# Patient Record
Sex: MALE | URBAN - METROPOLITAN AREA
[De-identification: names, ages, dates, MRNs, and addresses within clinical notes are randomized per-mention and may not be internally consistent; named-entity substitution may affect disease eponyms.]

---

## 2017-10-28 ENCOUNTER — INPATIENT (INPATIENT)
Facility: HOSPITAL | Age: 82
LOS: 12 days | Discharge: HOSPICE | DRG: 193 | End: 2017-11-10
Attending: INTERNAL MEDICINE | Admitting: INTERNAL MEDICINE
Payer: MEDICARE

## 2017-10-28 VITALS
HEIGHT: 74 IN | WEIGHT: 160.06 LBS | SYSTOLIC BLOOD PRESSURE: 160 MMHG | TEMPERATURE: 98 F | RESPIRATION RATE: 20 BRPM | DIASTOLIC BLOOD PRESSURE: 100 MMHG | HEART RATE: 78 BPM | OXYGEN SATURATION: 100 %

## 2017-10-28 PROBLEM — Z00.00 ENCOUNTER FOR PREVENTIVE HEALTH EXAMINATION: Status: ACTIVE | Noted: 2017-10-28

## 2017-10-28 PROCEDURE — 71010: CPT | Mod: 26

## 2017-10-28 NOTE — ED ADULT NURSE NOTE - ED STAT RN HANDOFF DETAILS 2
report given to EVER Ramos on [address] room 618[address] in stable condition for continuation of care. pt admitted for aspiration pneumonia. 20G RAC. lives at home with 24/7 HHA.

## 2017-10-28 NOTE — ED ADULT NURSE NOTE - OBJECTIVE STATEMENT
89 y/o M pt with no significant PMHx and no significant PSHx presents to ED with increased lethargy and cough x2 weeks. As per pt's relative, pt is minimally verbal, and not active; pt is currently more lethargic than at baseline. Pt's relative reports pt lives with a 24/7 home health aide. 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

## 2017-10-29 DIAGNOSIS — I48.91 UNSPECIFIED ATRIAL FIBRILLATION: ICD-10-CM

## 2017-10-29 DIAGNOSIS — Z95.1 PRESENCE OF AORTOCORONARY BYPASS GRAFT: Chronic | ICD-10-CM

## 2017-10-29 DIAGNOSIS — N17.9 ACUTE KIDNEY FAILURE, UNSPECIFIED: ICD-10-CM

## 2017-10-29 DIAGNOSIS — J69.0 PNEUMONITIS DUE TO INHALATION OF FOOD AND VOMIT: ICD-10-CM

## 2017-10-29 DIAGNOSIS — Z29.9 ENCOUNTER FOR PROPHYLACTIC MEASURES, UNSPECIFIED: ICD-10-CM

## 2017-10-29 LAB
ALBUMIN SERPL ELPH-MCNC: 3.2 G/DL — LOW (ref 3.5–5)
ALP SERPL-CCNC: 95 U/L — SIGNIFICANT CHANGE UP (ref 40–120)
ALT FLD-CCNC: 26 U/L DA — SIGNIFICANT CHANGE UP (ref 10–60)
ANION GAP SERPL CALC-SCNC: 6 MMOL/L — SIGNIFICANT CHANGE UP (ref 5–17)
ANION GAP SERPL CALC-SCNC: 9 MMOL/L — SIGNIFICANT CHANGE UP (ref 5–17)
APPEARANCE UR: CLEAR — SIGNIFICANT CHANGE UP
APTT BLD: 34 SEC — SIGNIFICANT CHANGE UP (ref 27.5–37.4)
AST SERPL-CCNC: 17 U/L — SIGNIFICANT CHANGE UP (ref 10–40)
BASOPHILS # BLD AUTO: 0 K/UL — SIGNIFICANT CHANGE UP (ref 0–0.2)
BASOPHILS # BLD AUTO: 0.1 K/UL — SIGNIFICANT CHANGE UP (ref 0–0.2)
BASOPHILS NFR BLD AUTO: 0.6 % — SIGNIFICANT CHANGE UP (ref 0–2)
BASOPHILS NFR BLD AUTO: 0.9 % — SIGNIFICANT CHANGE UP (ref 0–2)
BILIRUB SERPL-MCNC: 0.8 MG/DL — SIGNIFICANT CHANGE UP (ref 0.2–1.2)
BILIRUB UR-MCNC: NEGATIVE — SIGNIFICANT CHANGE UP
BUN SERPL-MCNC: 33 MG/DL — HIGH (ref 7–18)
BUN SERPL-MCNC: 34 MG/DL — HIGH (ref 7–18)
CALCIUM SERPL-MCNC: 9.4 MG/DL — SIGNIFICANT CHANGE UP (ref 8.4–10.5)
CALCIUM SERPL-MCNC: 9.6 MG/DL — SIGNIFICANT CHANGE UP (ref 8.4–10.5)
CHLORIDE SERPL-SCNC: 111 MMOL/L — HIGH (ref 96–108)
CHLORIDE SERPL-SCNC: 113 MMOL/L — HIGH (ref 96–108)
CHOLEST SERPL-MCNC: 127 MG/DL — SIGNIFICANT CHANGE UP (ref 10–199)
CK MB BLD-MCNC: 5.7 % — HIGH (ref 0–3.5)
CK MB BLD-MCNC: 5.8 % — HIGH (ref 0–3.5)
CK MB CFR SERPL CALC: 2.6 NG/ML — SIGNIFICANT CHANGE UP (ref 0–3.6)
CK MB CFR SERPL CALC: 2.8 NG/ML — SIGNIFICANT CHANGE UP (ref 0–3.6)
CK SERPL-CCNC: 46 U/L — SIGNIFICANT CHANGE UP (ref 35–232)
CK SERPL-CCNC: 48 U/L — SIGNIFICANT CHANGE UP (ref 35–232)
CO2 SERPL-SCNC: 26 MMOL/L — SIGNIFICANT CHANGE UP (ref 22–31)
CO2 SERPL-SCNC: 29 MMOL/L — SIGNIFICANT CHANGE UP (ref 22–31)
COLOR SPEC: YELLOW — SIGNIFICANT CHANGE UP
CREAT SERPL-MCNC: 1.78 MG/DL — HIGH (ref 0.5–1.3)
CREAT SERPL-MCNC: 2 MG/DL — HIGH (ref 0.5–1.3)
DIFF PNL FLD: ABNORMAL
EOSINOPHIL # BLD AUTO: 0 K/UL — SIGNIFICANT CHANGE UP (ref 0–0.5)
EOSINOPHIL # BLD AUTO: 0 K/UL — SIGNIFICANT CHANGE UP (ref 0–0.5)
EOSINOPHIL NFR BLD AUTO: 0 % — SIGNIFICANT CHANGE UP (ref 0–6)
EOSINOPHIL NFR BLD AUTO: 0 % — SIGNIFICANT CHANGE UP (ref 0–6)
FOLATE SERPL-MCNC: >20 NG/ML — SIGNIFICANT CHANGE UP (ref 4.8–24.2)
GLUCOSE SERPL-MCNC: 111 MG/DL — HIGH (ref 70–99)
GLUCOSE SERPL-MCNC: 151 MG/DL — HIGH (ref 70–99)
GLUCOSE UR QL: NEGATIVE — SIGNIFICANT CHANGE UP
HBA1C BLD-MCNC: 5.6 % — SIGNIFICANT CHANGE UP (ref 4–5.6)
HCT VFR BLD CALC: 40.2 % — SIGNIFICANT CHANGE UP (ref 39–50)
HCT VFR BLD CALC: 42.3 % — SIGNIFICANT CHANGE UP (ref 39–50)
HDLC SERPL-MCNC: 44 MG/DL — SIGNIFICANT CHANGE UP (ref 40–125)
HGB BLD-MCNC: 12.5 G/DL — LOW (ref 13–17)
HGB BLD-MCNC: 13.6 G/DL — SIGNIFICANT CHANGE UP (ref 13–17)
INR BLD: 1.33 RATIO — HIGH (ref 0.88–1.16)
KETONES UR-MCNC: ABNORMAL
LACTATE SERPL-SCNC: 2.2 MMOL/L — HIGH (ref 0.7–2)
LACTATE SERPL-SCNC: 2.4 MMOL/L — HIGH (ref 0.7–2)
LEUKOCYTE ESTERASE UR-ACNC: NEGATIVE — SIGNIFICANT CHANGE UP
LIPID PNL WITH DIRECT LDL SERPL: 65 MG/DL — SIGNIFICANT CHANGE UP
LYMPHOCYTES # BLD AUTO: 0.6 K/UL — LOW (ref 1–3.3)
LYMPHOCYTES # BLD AUTO: 0.6 K/UL — LOW (ref 1–3.3)
LYMPHOCYTES # BLD AUTO: 7.3 % — LOW (ref 13–44)
LYMPHOCYTES # BLD AUTO: 8.6 % — LOW (ref 13–44)
MAGNESIUM SERPL-MCNC: 2.6 MG/DL — SIGNIFICANT CHANGE UP (ref 1.6–2.6)
MCHC RBC-ENTMCNC: 31.1 GM/DL — LOW (ref 32–36)
MCHC RBC-ENTMCNC: 31.6 PG — SIGNIFICANT CHANGE UP (ref 27–34)
MCHC RBC-ENTMCNC: 32.1 GM/DL — SIGNIFICANT CHANGE UP (ref 32–36)
MCHC RBC-ENTMCNC: 32.9 PG — SIGNIFICANT CHANGE UP (ref 27–34)
MCV RBC AUTO: 101.8 FL — HIGH (ref 80–100)
MCV RBC AUTO: 102.4 FL — HIGH (ref 80–100)
MONOCYTES # BLD AUTO: 0.7 K/UL — SIGNIFICANT CHANGE UP (ref 0–0.9)
MONOCYTES # BLD AUTO: 0.8 K/UL — SIGNIFICANT CHANGE UP (ref 0–0.9)
MONOCYTES NFR BLD AUTO: 11.2 % — SIGNIFICANT CHANGE UP (ref 2–14)
MONOCYTES NFR BLD AUTO: 8.9 % — SIGNIFICANT CHANGE UP (ref 2–14)
NEUTROPHILS # BLD AUTO: 5.8 K/UL — SIGNIFICANT CHANGE UP (ref 1.8–7.4)
NEUTROPHILS # BLD AUTO: 6.4 K/UL — SIGNIFICANT CHANGE UP (ref 1.8–7.4)
NEUTROPHILS NFR BLD AUTO: 79.5 % — HIGH (ref 43–77)
NEUTROPHILS NFR BLD AUTO: 82.9 % — HIGH (ref 43–77)
NITRITE UR-MCNC: NEGATIVE — SIGNIFICANT CHANGE UP
NT-PROBNP SERPL-SCNC: HIGH PG/ML (ref 0–450)
PH UR: 5 — SIGNIFICANT CHANGE UP (ref 5–8)
PHOSPHATE SERPL-MCNC: 4.2 MG/DL — SIGNIFICANT CHANGE UP (ref 2.5–4.5)
PLATELET # BLD AUTO: 136 K/UL — LOW (ref 150–400)
PLATELET # BLD AUTO: 146 K/UL — LOW (ref 150–400)
POTASSIUM SERPL-MCNC: 4.2 MMOL/L — SIGNIFICANT CHANGE UP (ref 3.5–5.3)
POTASSIUM SERPL-MCNC: 4.5 MMOL/L — SIGNIFICANT CHANGE UP (ref 3.5–5.3)
POTASSIUM SERPL-SCNC: 4.2 MMOL/L — SIGNIFICANT CHANGE UP (ref 3.5–5.3)
POTASSIUM SERPL-SCNC: 4.5 MMOL/L — SIGNIFICANT CHANGE UP (ref 3.5–5.3)
PROT SERPL-MCNC: 7.2 G/DL — SIGNIFICANT CHANGE UP (ref 6–8.3)
PROT UR-MCNC: 500 MG/DL
PROTHROM AB SERPL-ACNC: 14.6 SEC — HIGH (ref 9.8–12.7)
RAPID RVP RESULT: SIGNIFICANT CHANGE UP
RBC # BLD: 3.96 M/UL — LOW (ref 4.2–5.8)
RBC # BLD: 4.13 M/UL — LOW (ref 4.2–5.8)
RBC # FLD: 15.2 % — HIGH (ref 10.3–14.5)
RBC # FLD: 16 % — HIGH (ref 10.3–14.5)
SODIUM SERPL-SCNC: 146 MMOL/L — HIGH (ref 135–145)
SODIUM SERPL-SCNC: 148 MMOL/L — HIGH (ref 135–145)
SP GR SPEC: 1.02 — SIGNIFICANT CHANGE UP (ref 1.01–1.02)
TOTAL CHOLESTEROL/HDL RATIO MEASUREMENT: 2.9 RATIO — LOW (ref 3.4–9.6)
TRIGL SERPL-MCNC: 91 MG/DL — SIGNIFICANT CHANGE UP (ref 10–149)
TROPONIN I SERPL-MCNC: 0.12 NG/ML — HIGH (ref 0–0.04)
TROPONIN I SERPL-MCNC: 0.14 NG/ML — HIGH (ref 0–0.04)
TSH SERPL-MCNC: 1.39 UU/ML — SIGNIFICANT CHANGE UP (ref 0.34–4.82)
UROBILINOGEN FLD QL: NEGATIVE — SIGNIFICANT CHANGE UP
VIT B12 SERPL-MCNC: 1368 PG/ML — HIGH (ref 243–894)
WBC # BLD: 7.3 K/UL — SIGNIFICANT CHANGE UP (ref 3.8–10.5)
WBC # BLD: 7.7 K/UL — SIGNIFICANT CHANGE UP (ref 3.8–10.5)
WBC # FLD AUTO: 7.3 K/UL — SIGNIFICANT CHANGE UP (ref 3.8–10.5)
WBC # FLD AUTO: 7.7 K/UL — SIGNIFICANT CHANGE UP (ref 3.8–10.5)

## 2017-10-29 PROCEDURE — 99285 EMERGENCY DEPT VISIT HI MDM: CPT | Mod: 25

## 2017-10-29 RX ORDER — IPRATROPIUM/ALBUTEROL SULFATE 18-103MCG
3 AEROSOL WITH ADAPTER (GRAM) INHALATION EVERY 6 HOURS
Qty: 0 | Refills: 0 | Status: DISCONTINUED | OUTPATIENT
Start: 2017-10-29 | End: 2017-11-10

## 2017-10-29 RX ORDER — SODIUM CHLORIDE 9 MG/ML
1000 INJECTION, SOLUTION INTRAVENOUS
Qty: 0 | Refills: 0 | Status: DISCONTINUED | OUTPATIENT
Start: 2017-10-29 | End: 2017-10-29

## 2017-10-29 RX ORDER — METRONIDAZOLE 500 MG
500 TABLET ORAL EVERY 8 HOURS
Qty: 0 | Refills: 0 | Status: DISCONTINUED | OUTPATIENT
Start: 2017-10-29 | End: 2017-10-30

## 2017-10-29 RX ORDER — SODIUM CHLORIDE 9 MG/ML
1000 INJECTION, SOLUTION INTRAVENOUS
Qty: 0 | Refills: 0 | Status: DISCONTINUED | OUTPATIENT
Start: 2017-10-29 | End: 2017-10-31

## 2017-10-29 RX ORDER — AZITHROMYCIN 500 MG/1
500 TABLET, FILM COATED ORAL ONCE
Qty: 0 | Refills: 0 | Status: COMPLETED | OUTPATIENT
Start: 2017-10-29 | End: 2017-10-29

## 2017-10-29 RX ORDER — SODIUM CHLORIDE 9 MG/ML
1000 INJECTION INTRAMUSCULAR; INTRAVENOUS; SUBCUTANEOUS ONCE
Qty: 0 | Refills: 0 | Status: COMPLETED | OUTPATIENT
Start: 2017-10-29 | End: 2017-10-29

## 2017-10-29 RX ORDER — INFLUENZA VIRUS VACCINE 15; 15; 15; 15 UG/.5ML; UG/.5ML; UG/.5ML; UG/.5ML
0.5 SUSPENSION INTRAMUSCULAR ONCE
Qty: 0 | Refills: 0 | Status: DISCONTINUED | OUTPATIENT
Start: 2017-10-29 | End: 2017-11-03

## 2017-10-29 RX ORDER — HEPARIN SODIUM 5000 [USP'U]/ML
5000 INJECTION INTRAVENOUS; SUBCUTANEOUS EVERY 8 HOURS
Qty: 0 | Refills: 0 | Status: DISCONTINUED | OUTPATIENT
Start: 2017-10-29 | End: 2017-11-03

## 2017-10-29 RX ORDER — AZITHROMYCIN 500 MG/1
500 TABLET, FILM COATED ORAL EVERY 24 HOURS
Qty: 0 | Refills: 0 | Status: DISCONTINUED | OUTPATIENT
Start: 2017-10-30 | End: 2017-10-30

## 2017-10-29 RX ORDER — ASPIRIN/CALCIUM CARB/MAGNESIUM 324 MG
300 TABLET ORAL DAILY
Qty: 0 | Refills: 0 | Status: DISCONTINUED | OUTPATIENT
Start: 2017-10-29 | End: 2017-11-03

## 2017-10-29 RX ORDER — ACETYLCYSTEINE 200 MG/ML
4 VIAL (ML) MISCELLANEOUS EVERY 6 HOURS
Qty: 0 | Refills: 0 | Status: DISCONTINUED | OUTPATIENT
Start: 2017-10-29 | End: 2017-11-03

## 2017-10-29 RX ORDER — AZITHROMYCIN 500 MG/1
TABLET, FILM COATED ORAL
Qty: 0 | Refills: 0 | Status: DISCONTINUED | OUTPATIENT
Start: 2017-10-29 | End: 2017-10-30

## 2017-10-29 RX ORDER — CEFTRIAXONE 500 MG/1
1 INJECTION, POWDER, FOR SOLUTION INTRAMUSCULAR; INTRAVENOUS EVERY 24 HOURS
Qty: 0 | Refills: 0 | Status: DISCONTINUED | OUTPATIENT
Start: 2017-10-29 | End: 2017-10-30

## 2017-10-29 RX ORDER — PIPERACILLIN AND TAZOBACTAM 4; .5 G/20ML; G/20ML
3.38 INJECTION, POWDER, LYOPHILIZED, FOR SOLUTION INTRAVENOUS ONCE
Qty: 0 | Refills: 0 | Status: COMPLETED | OUTPATIENT
Start: 2017-10-29 | End: 2017-10-29

## 2017-10-29 RX ADMIN — Medication 4 MILLILITER(S): at 20:47

## 2017-10-29 RX ADMIN — SODIUM CHLORIDE 60 MILLILITER(S): 9 INJECTION, SOLUTION INTRAVENOUS at 02:08

## 2017-10-29 RX ADMIN — AZITHROMYCIN 250 MILLIGRAM(S): 500 TABLET, FILM COATED ORAL at 02:08

## 2017-10-29 RX ADMIN — SODIUM CHLORIDE 60 MILLILITER(S): 9 INJECTION, SOLUTION INTRAVENOUS at 22:57

## 2017-10-29 RX ADMIN — Medication 3 MILLILITER(S): at 20:46

## 2017-10-29 RX ADMIN — HEPARIN SODIUM 5000 UNIT(S): 5000 INJECTION INTRAVENOUS; SUBCUTANEOUS at 15:25

## 2017-10-29 RX ADMIN — Medication 300 MILLIGRAM(S): at 11:54

## 2017-10-29 RX ADMIN — CEFTRIAXONE 100 GRAM(S): 500 INJECTION, POWDER, FOR SOLUTION INTRAMUSCULAR; INTRAVENOUS at 02:26

## 2017-10-29 RX ADMIN — Medication 100 MILLIGRAM(S): at 22:56

## 2017-10-29 RX ADMIN — HEPARIN SODIUM 5000 UNIT(S): 5000 INJECTION INTRAVENOUS; SUBCUTANEOUS at 22:56

## 2017-10-29 RX ADMIN — Medication 3 MILLILITER(S): at 09:48

## 2017-10-29 RX ADMIN — Medication 100 MILLIGRAM(S): at 06:01

## 2017-10-29 RX ADMIN — SODIUM CHLORIDE 60 MILLILITER(S): 9 INJECTION, SOLUTION INTRAVENOUS at 06:04

## 2017-10-29 RX ADMIN — HEPARIN SODIUM 5000 UNIT(S): 5000 INJECTION INTRAVENOUS; SUBCUTANEOUS at 06:00

## 2017-10-29 RX ADMIN — SODIUM CHLORIDE 1000 MILLILITER(S): 9 INJECTION INTRAMUSCULAR; INTRAVENOUS; SUBCUTANEOUS at 01:47

## 2017-10-29 RX ADMIN — PIPERACILLIN AND TAZOBACTAM 200 GRAM(S): 4; .5 INJECTION, POWDER, LYOPHILIZED, FOR SOLUTION INTRAVENOUS at 02:04

## 2017-10-29 RX ADMIN — Medication 3 MILLILITER(S): at 15:30

## 2017-10-29 RX ADMIN — Medication 3 MILLILITER(S): at 02:26

## 2017-10-29 RX ADMIN — Medication 100 MILLIGRAM(S): at 15:24

## 2017-10-29 NOTE — ED ADULT NURSE REASSESSMENT NOTE - NS ED NURSE REASSESS COMMENT FT1
patient condition remains fair. Breathing not distressed, has a chesty cough, no sputum production. IV infusion in progress as ordered. Patient was medicated as ordered. Vitals charted. Is being admitted, awaiting William RN to accept.

## 2017-10-29 NOTE — H&P ADULT - PROBLEM SELECTOR PLAN 3
-Unknown baseline(family says that he had some 'kidney condition' before'. Pt looks clinically dehydrated, likely CKD + prerenal from infection/dehydration.  -Will continue IV hydration for now, at low rate  -f/u renal function in AM, avoid nephrotoxins.

## 2017-10-29 NOTE — H&P ADULT - PROBLEM SELECTOR PLAN 2
-bilateral ankle edema with increased pro-BNP  -Hold diuretics for now, as patient is in JUDAH with acute infection.  -Primary team please call pharmacy in AM and perform med rec.

## 2017-10-29 NOTE — H&P ADULT - ATTENDING COMMENTS
Patient seen and examined. Patient's history, vitals, labs, imaging studies reviewed. Discussed with above resident, agree with note with edits.   Dinorah Felix MD  10/29/2017

## 2017-10-29 NOTE — H&P ADULT - PROBLEM SELECTOR PLAN 1
-Poor oral hygiene, advanced age, bilateral rhonchi Rt>Lt, concern for aspiration PNA.  -Starting Rocephin, zithro, and Flagyl  -NPO for now  -Speech and swallow eval, aspiration precaution  -Check urine legionella and streptococcal Ag  -F/u blood culture  -Gentle hydration as patient has h/o CHF; D5+1/2 NS at 60cc/hr -Poor oral hygiene, advanced age, bilateral rhonchi Rt>Lt, concern for aspiration PNA.  -Starting Rocephin, zithro, and Flagyl  -NPO for now  -Speech and swallow eval, aspiration precaution  -Check urine legionella and streptococcal Ag  -F/u blood culture  -Gentle hydration as patient has h/o CHF; D5+1/2 NS at 60cc/hr  -ID Dr. Clancy

## 2017-10-29 NOTE — H&P ADULT - PROBLEM SELECTOR PLAN 4
-Pt is currently not on AC, however need to check with pharmacy in AM  -CHADSVASC score 3, HAS-BLED score 3 from given information. Patient is considered to be at high risk of having hemorrhage from AC, so not starting full dose AC for now  -Continue home med aspirin, but with rectal administration as patient is at high risk for aspiration.  -H/o bradycardia; not starting tiffanie blockers.

## 2017-10-29 NOTE — ED PROVIDER NOTE - MEDICAL DECISION MAKING DETAILS
89 y/o M pt presents with increased lethargy and cough. Will evaluate for possible aspiration PNA versus sepsis. Will check labs, hydrate, give fluids, check urine, CXR, and likely admission.

## 2017-10-29 NOTE — ED PROVIDER NOTE - ENMT, MLM
Airway patent, Nasal mucosa dry. Mouth with dry mucosa. Throat has no vesicles, no oropharyngeal exudates and uvula is midline.

## 2017-10-29 NOTE — H&P ADULT - NSHPLABSRESULTS_GEN_ALL_CORE
LABS:                        13.6   7.7   )-----------( 146      ( 29 Oct 2017 01:14 )             42.3     10-    148<H>  |  113<H>  |  34<H>  ----------------------------<  111<H>  4.5   |  29  |  2.00<H>    Ca    9.4      29 Oct 2017 01:14    TPro  7.2  /  Alb  3.2<L>  /  TBili  0.8  /  DBili  x   /  AST  17  /  ALT  26  /  AlkPhos  95  10-    PT/INR - ( 29 Oct 2017 01:14 )   PT: 14.6 sec;   INR: 1.33 ratio         PTT - ( 29 Oct 2017 01:14 )  PTT:34.0 sec  Urinalysis Basic - ( 29 Oct 2017 01:58 )    Color: Yellow / Appearance: Clear / S.025 / pH: x  Gluc: x / Ketone: Trace  / Bili: Negative / Urobili: Negative   Blood: x / Protein: 500 mg/dL / Nitrite: Negative   Leuk Esterase: Negative / RBC: 2-5 /HPF / WBC 0-2 /HPF   Sq Epi: x / Non Sq Epi: Few /HPF / Bacteria: x      CAPILLARY BLOOD GLUCOSE        LIVER FUNCTIONS - ( 29 Oct 2017 01:14 )  Alb: 3.2 g/dL / Pro: 7.2 g/dL / ALK PHOS: 95 U/L / ALT: 26 U/L DA / AST: 17 U/L / GGT: x

## 2017-10-29 NOTE — H&P ADULT - HISTORY OF PRESENT ILLNESS
89 years old male from home, has HHA 24/7, minimally verbal, with PMH of   came to ED c/o lethargy and generalized weakness. 89 years old male from home, lives alone, walks with walker intermittently, has HHA 24/7, minimally verbal, with PMH of CAD s/p triple bypass surgery. 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 Patient is very lethargic and not following commands well, so history was given by the HCP Ms. Henry and her son at the bedside. Patient does not have direct family members. Per Mr. Keshav Henry who visits pt's home regularly, patient looked tired than usual on last Saturday(1 week ago) so he recommended he visit his PMD, however patient got upset and refused it. Patient was still able to walk with walker that time. 4 days ago(This Tuesday) he visited patient again, patient looked still bit lethargic however he was able to eat his regular diet. Last night pt's aid reported him that patient could not tolerate oral intake and had to give him pureed food (which is unusual), so Mr. Henry called EMS and brought the patient to ED for further evaluation. Patient has had chronic cough for the last few years, which also got worse with 'gurgling sound' for the same period of time. No reported fever from HHA, no other reported symptoms such as chest pain, nausea, vomiting, SOB, diarrhea, etc.     In ED, pt is hypertensive 160-170/100, O2 sat 100% with NC, labs significant for hypernatremia of 148 with BUN/Cr 34/2.00, pro-BNP 19032, troponin 0.142, CXR bilateral patchy opacities suspicious for multifocal PNA, Rt>Lt. Bilateral rhochi was appreciated on physical exam. EKG afib with rate controlled, unsure if patient had Afib before. Patient is admitted to the medicine floor for suspected aspiration PNA vs CAP.     Of note, according to pt's HCP form(Ms. Henry has it) it was written: I do not wish my life to be prolonged with mechanical repipration, tube feeding, or any other life support, if I should be in an incurable or irreversible mental or physical condition with no reasonable expectation of recovery. Discussed with HCP and her son at the bedside as patient does not have medical capacity at this moment, it is unsure if his PNA is 'incurable or irreversible' condition that was defined by his HCP form. So he will remain in full code for now, and they agreed with it. 89 years old male from home, lives alone, walks with walker intermittently, has HHA 24/7, minimally verbal, with PMH of CAD s/p triple bypass surgery. 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 Patient is very lethargic and not following commands well, so history was given by the HCP Ms. Henry and her son at the bedside. Patient does not have direct family members. Per Mr. Keshav Henry who visits pt's home regularly, patient looked tired than usual on last Saturday(1 week ago) so he recommended he visit his PMD, however patient got upset and refused it. Patient was still able to walk with walker that time. 4 days ago(This Tuesday) he visited patient again, patient looked still bit lethargic however he was able to eat his regular diet. Last night pt's aid reported him that patient could not tolerate oral intake and had to give him pureed food (which is unusual), so Mr. Henry called EMS and brought the patient to ED for further evaluation. Patient has had chronic cough for the last few years, which also got worse with 'gurgling sound' for the same period of time. No reported fever from HHA, no other reported symptoms such as chest pain, nausea, vomiting, SOB, diarrhea, etc.     In ED, pt is hypertensive 160-170/100, O2 sat 100% with NC, labs significant for hypernatremia of 148 with BUN/Cr 34/2.00, pro-BNP 15239, troponin 0.142, CXR bilateral patchy opacities suspicious for multifocal PNA, Rt>Lt. Bilateral rhochi was appreciated on physical exam. EKG afib with rate controlled, unsure if patient had Afib before. Patient is admitted to the medicine floor for suspected aspiration PNA vs CAP.     Of note, according to pt's HCP form(Ms. Henry has it) it was written as: I do not wish my life to be prolonged with mechanical respiration, tube feeding, or any other life support, if I should be in an incurable or irreversible mental or physical condition with no reasonable expectation of recovery. Discussed with HCP and her son at the bedside as patient does not have medical capacity at this moment. It is unsure if his PNA is 'incurable or irreversible' condition that was defined by his HCP form, so he will remain in full code for now, and they agreed with it. 89 years old male from home, lives alone, walks with walker intermittently, has HHA 24/7, minimally verbal, with PMH of CAD s/p triple bypass surgery. 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 Patient is very lethargic and not following commands well, so history was given by the HCP Ms. Henry and her son at the bedside. Patient does not have direct family members. Per Mr. Keshav Henry who visits pt's home regularly, patient looked tired than usual on last Saturday(1 week ago) so he recommended he visit his PMD, however patient got upset and refused it. Patient was still able to walk with walker that time. 4 days ago(This Tuesday) he visited patient again, patient looked still bit lethargic however he was able to eat his regular diet. Last night pt's aid reported him that patient could not tolerate oral intake and had to give him pureed food (which is unusual), so Mr. Henry called EMS and brought the patient to ED for further evaluation. Patient has had chronic cough for the last few years, which also got worse with 'gurgling sound' for the same period of time. No reported fever from HHA, no other reported symptoms such as chest pain, nausea, vomiting, SOB, diarrhea, etc.     In ED, pt is hypertensive 160-170/100, O2 sat 100% with NC, labs significant for hypernatremia of 148 with BUN/Cr 34/2.00, pro-BNP 90094, troponin 0.142, CXR bilateral patchy opacities suspicious for multifocal PNA, Rt>Lt. Bilateral rhochi was appreciated on physical exam. EKG afib with rate controlled, unsure if patient had Afib before. Patient is admitted to the medicine floor for suspected aspiration PNA vs CAP.     Of note, according to pt's HCP form(Ms. Henry has it) it was written as: I do not wish my life to be prolonged with mechanical respiration, tube feeding, or any other life support, if I should be in an incurable or irreversible mental or physical condition with no reasonable expectation of recovery. Discussed with HCP and her son at the bedside as patient does not have medical capacity at this moment. It is unsure if his PNA is 'incurable or irreversible' condition that was defined by his HCP form, so he will remain in full code for now, and they agreed with it.  HCP phone #:  [phone]

## 2017-10-29 NOTE — ED PROVIDER NOTE - OBJECTIVE STATEMENT
89 y/o M pt with no significant PMHx and no significant PSHx presents to ED with increased lethargy and cough x2 weeks. As per pt's son, pt is minimally verbal, and not active; pt is currently more lethargic than at baseline. Pt's son reports pt lives with a 24/7 home health aide. Pt's son denies any other pt complaints. NKDA.

## 2017-10-29 NOTE — H&P ADULT - NSHPPHYSICALEXAM_GEN_ALL_CORE
PHYSICAL EXAM:  GENERAL: NAD, well-groomed, well-developed  HEAD:  Atraumatic, Normocephalic  EYES: EOMI, PERRLA, conjunctiva and sclera clear  ENMT: No tonsillar erythema, exudates, or enlargement; Moist mucous membranes, Good dentition, No lesions  NECK: Supple, No JVD, Normal thyroid  NERVOUS SYSTEM:  Alert & Oriented X3, Good concentration; Motor Strength 5/5 B/L upper and lower extremities; DTRs 2+ intact and symmetric  CHEST/LUNG: Clear to percussion bilaterally; No rales, rhonchi, wheezing, or rubs  HEART: Regular rate and rhythm; No murmurs, rubs, or gallops  ABDOMEN: Soft, Nontender, Nondistended; Bowel sounds present  EXTREMITIES:  2+ Peripheral Pulses bilaterally, No clubbing, cyanosis, or edema  LYMPH: No lymphadenopathy noted  SKIN: No rashes or lesions    T(C): 36.5 (10-28-17 @ 23:10), Max: 36.5 (10-28-17 @ 23:10)  HR: 78 (10-28-17 @ 23:10) (78 - 78)  BP: 160/100 (10-28-17 @ 23:10) (160/100 - 160/100)  RR: 20 (10-28-17 @ 23:10) (20 - 20)  SpO2: 100% (10-28-17 @ 23:10) (100% - 100%)  Wt(kg): --  I&O's Summary PHYSICAL EXAM:  GENERAL: NAD  HEAD:  Atraumatic, Normocephalic  EYES: EOMI, PERRLA, conjunctiva and sclera clear  ENMT: Dry mucous membranes, poor oral hygiene  NECK: Supple, No JVD, Normal thyroid  NERVOUS SYSTEM:  Alert & Oriented X0  CHEST/LUNG: Rt anterior chest rales, bilateral rhonchi Rt>Lt, no wheezing, or rubs  HEART: irregular rate and rhythm; No murmurs, rubs, or gallops  ABDOMEN: Soft, Nontender, Nondistended; Bowel sounds present  EXTREMITIES:  2+ Peripheral Pulses bilaterally, No clubbing, cyanosis. bilateral ankle trace to 1+ pitting edema  LYMPH: No lymphadenopathy noted  SKIN: No rashes or lesions. No decubitus ulcer noted on sacral area.    T(C): 36.5 (10-28-17 @ 23:10), Max: 36.5 (10-28-17 @ 23:10)  HR: 78 (10-28-17 @ 23:10) (78 - 78)  BP: 160/100 (10-28-17 @ 23:10) (160/100 - 160/100)  RR: 20 (10-28-17 @ 23:10) (20 - 20)  SpO2: 100% (10-28-17 @ 23:10) (100% - 100%)  Wt(kg): --  I&O's Summary

## 2017-10-29 NOTE — H&P ADULT - ASSESSMENT
Patient is a 89y old  Male who presents with a chief complaint of lethargy, is admitted to the medicine floor for PNA likely from community acquired vs aspiration.

## 2017-10-29 NOTE — H&P ADULT - PMH
No pertinent past medical history BPH (benign prostatic hyperplasia)    Bradycardia    CAD (coronary artery disease)    CHF (congestive heart failure)

## 2017-10-30 DIAGNOSIS — F03.90 UNSPECIFIED DEMENTIA WITHOUT BEHAVIORAL DISTURBANCE: ICD-10-CM

## 2017-10-30 DIAGNOSIS — R78.81 BACTEREMIA: ICD-10-CM

## 2017-10-30 DIAGNOSIS — R53.81 OTHER MALAISE: ICD-10-CM

## 2017-10-30 DIAGNOSIS — Z51.5 ENCOUNTER FOR PALLIATIVE CARE: ICD-10-CM

## 2017-10-30 DIAGNOSIS — E87.0 HYPEROSMOLALITY AND HYPERNATREMIA: ICD-10-CM

## 2017-10-30 DIAGNOSIS — R06.02 SHORTNESS OF BREATH: ICD-10-CM

## 2017-10-30 LAB
-  COAGULASE NEGATIVE STAPHYLOCOCCUS: SIGNIFICANT CHANGE UP
ANION GAP SERPL CALC-SCNC: 10 MMOL/L — SIGNIFICANT CHANGE UP (ref 5–17)
BASOPHILS # BLD AUTO: 0.1 K/UL — SIGNIFICANT CHANGE UP (ref 0–0.2)
BASOPHILS NFR BLD AUTO: 1 % — SIGNIFICANT CHANGE UP (ref 0–2)
BUN SERPL-MCNC: 35 MG/DL — HIGH (ref 7–18)
CALCIUM SERPL-MCNC: 9.4 MG/DL — SIGNIFICANT CHANGE UP (ref 8.4–10.5)
CHLORIDE SERPL-SCNC: 113 MMOL/L — HIGH (ref 96–108)
CO2 SERPL-SCNC: 26 MMOL/L — SIGNIFICANT CHANGE UP (ref 22–31)
CREAT SERPL-MCNC: 1.85 MG/DL — HIGH (ref 0.5–1.3)
CULTURE RESULTS: NO GROWTH — SIGNIFICANT CHANGE UP
EOSINOPHIL # BLD AUTO: 0 K/UL — SIGNIFICANT CHANGE UP (ref 0–0.5)
EOSINOPHIL NFR BLD AUTO: 0.4 % — SIGNIFICANT CHANGE UP (ref 0–6)
GLUCOSE SERPL-MCNC: 135 MG/DL — HIGH (ref 70–99)
GRAM STN FLD: SIGNIFICANT CHANGE UP
HCT VFR BLD CALC: 38 % — LOW (ref 39–50)
HGB BLD-MCNC: 12 G/DL — LOW (ref 13–17)
LEGIONELLA AG UR QL: NEGATIVE — SIGNIFICANT CHANGE UP
LYMPHOCYTES # BLD AUTO: 0.5 K/UL — LOW (ref 1–3.3)
LYMPHOCYTES # BLD AUTO: 5.9 % — LOW (ref 13–44)
MAGNESIUM SERPL-MCNC: 2.7 MG/DL — HIGH (ref 1.6–2.6)
MCHC RBC-ENTMCNC: 31.4 PG — SIGNIFICANT CHANGE UP (ref 27–34)
MCHC RBC-ENTMCNC: 31.7 GM/DL — LOW (ref 32–36)
MCV RBC AUTO: 99 FL — SIGNIFICANT CHANGE UP (ref 80–100)
METHOD TYPE: SIGNIFICANT CHANGE UP
MONOCYTES # BLD AUTO: 0.8 K/UL — SIGNIFICANT CHANGE UP (ref 0–0.9)
MONOCYTES NFR BLD AUTO: 9 % — SIGNIFICANT CHANGE UP (ref 2–14)
NEUTROPHILS # BLD AUTO: 7.4 K/UL — SIGNIFICANT CHANGE UP (ref 1.8–7.4)
NEUTROPHILS NFR BLD AUTO: 83.6 % — HIGH (ref 43–77)
PHOSPHATE SERPL-MCNC: 3.2 MG/DL — SIGNIFICANT CHANGE UP (ref 2.5–4.5)
PLATELET # BLD AUTO: 149 K/UL — LOW (ref 150–400)
POTASSIUM SERPL-MCNC: 3.7 MMOL/L — SIGNIFICANT CHANGE UP (ref 3.5–5.3)
POTASSIUM SERPL-SCNC: 3.7 MMOL/L — SIGNIFICANT CHANGE UP (ref 3.5–5.3)
RBC # BLD: 3.83 M/UL — LOW (ref 4.2–5.8)
RBC # FLD: 15.2 % — HIGH (ref 10.3–14.5)
S PNEUM AG SER QL: SIGNIFICANT CHANGE UP
SODIUM SERPL-SCNC: 149 MMOL/L — HIGH (ref 135–145)
SPECIMEN SOURCE: SIGNIFICANT CHANGE UP
WBC # BLD: 8.8 K/UL — SIGNIFICANT CHANGE UP (ref 3.8–10.5)
WBC # FLD AUTO: 8.8 K/UL — SIGNIFICANT CHANGE UP (ref 3.8–10.5)

## 2017-10-30 PROCEDURE — 99497 ADVNCD CARE PLAN 30 MIN: CPT | Mod: 25

## 2017-10-30 PROCEDURE — 99223 1ST HOSP IP/OBS HIGH 75: CPT

## 2017-10-30 RX ORDER — VANCOMYCIN HCL 1 G
1000 VIAL (EA) INTRAVENOUS ONCE
Qty: 0 | Refills: 0 | Status: COMPLETED | OUTPATIENT
Start: 2017-10-30 | End: 2017-10-30

## 2017-10-30 RX ORDER — MEROPENEM 1 G/30ML
500 INJECTION INTRAVENOUS ONCE
Qty: 0 | Refills: 0 | Status: DISCONTINUED | OUTPATIENT
Start: 2017-10-30 | End: 2017-10-30

## 2017-10-30 RX ORDER — INSULIN HUMAN 100 [IU]/ML
INJECTION, SOLUTION SUBCUTANEOUS EVERY 6 HOURS
Qty: 0 | Refills: 0 | Status: DISCONTINUED | OUTPATIENT
Start: 2017-10-30 | End: 2017-11-03

## 2017-10-30 RX ORDER — MEROPENEM 1 G/30ML
500 INJECTION INTRAVENOUS EVERY 12 HOURS
Qty: 0 | Refills: 0 | Status: DISCONTINUED | OUTPATIENT
Start: 2017-10-30 | End: 2017-11-06

## 2017-10-30 RX ORDER — MEROPENEM 1 G/30ML
INJECTION INTRAVENOUS
Qty: 0 | Refills: 0 | Status: DISCONTINUED | OUTPATIENT
Start: 2017-10-30 | End: 2017-10-30

## 2017-10-30 RX ADMIN — AZITHROMYCIN 250 MILLIGRAM(S): 500 TABLET, FILM COATED ORAL at 02:20

## 2017-10-30 RX ADMIN — Medication 4 MILLILITER(S): at 14:19

## 2017-10-30 RX ADMIN — CEFTRIAXONE 100 GRAM(S): 500 INJECTION, POWDER, FOR SOLUTION INTRAMUSCULAR; INTRAVENOUS at 01:04

## 2017-10-30 RX ADMIN — SODIUM CHLORIDE 60 MILLILITER(S): 9 INJECTION, SOLUTION INTRAVENOUS at 05:42

## 2017-10-30 RX ADMIN — Medication 250 MILLIGRAM(S): at 13:18

## 2017-10-30 RX ADMIN — HEPARIN SODIUM 5000 UNIT(S): 5000 INJECTION INTRAVENOUS; SUBCUTANEOUS at 13:18

## 2017-10-30 RX ADMIN — HEPARIN SODIUM 5000 UNIT(S): 5000 INJECTION INTRAVENOUS; SUBCUTANEOUS at 05:41

## 2017-10-30 RX ADMIN — Medication 3 MILLILITER(S): at 14:19

## 2017-10-30 RX ADMIN — INSULIN HUMAN: 100 INJECTION, SOLUTION SUBCUTANEOUS at 00:30

## 2017-10-30 RX ADMIN — Medication 100 MILLIGRAM(S): at 05:41

## 2017-10-30 RX ADMIN — Medication 3 MILLILITER(S): at 21:09

## 2017-10-30 RX ADMIN — Medication 3 MILLILITER(S): at 08:48

## 2017-10-30 RX ADMIN — Medication 4 MILLILITER(S): at 08:52

## 2017-10-30 RX ADMIN — Medication 300 MILLIGRAM(S): at 13:15

## 2017-10-30 RX ADMIN — Medication 4 MILLILITER(S): at 21:09

## 2017-10-30 RX ADMIN — MEROPENEM 200 MILLIGRAM(S): 1 INJECTION INTRAVENOUS at 17:26

## 2017-10-30 RX ADMIN — HEPARIN SODIUM 5000 UNIT(S): 5000 INJECTION INTRAVENOUS; SUBCUTANEOUS at 21:34

## 2017-10-30 RX ADMIN — Medication 100 MILLIGRAM(S): at 13:18

## 2017-10-30 NOTE — PROGRESS NOTE ADULT - PROBLEM SELECTOR PLAN 2
blood culture showing gram positive cocci  f/u blood cx  s/p 1 dose of vancomycin blood culture showing gram positive cocci in clusters  s/p 1 dose of vancomycin

## 2017-10-30 NOTE — PROGRESS NOTE ADULT - PROBLEM SELECTOR PLAN 3
sodium of 149  c/w d4 half ns  caution with increasing rate as patient has leg edema sodium of 149  c/w d5 half ns  caution with increasing rate as patient has leg edema

## 2017-10-30 NOTE — SWALLOW BEDSIDE ASSESSMENT ADULT - ASR SWALLOW ASPIRATION MONITOR
change of breathing pattern/position upright (90Y)/gurgly voice/pneumonia/throat clearing/upper respiratory infection/cough/fever/oral hygiene

## 2017-10-30 NOTE — SWALLOW BEDSIDE ASSESSMENT ADULT - PHARYNGEAL PHASE
Decreased laryngeal elevation/Multiple swallows Delayed cough post oral intake/Delayed pharyngeal swallow/Decreased laryngeal elevation/Throat clear post oral intake

## 2017-10-30 NOTE — SWALLOW BEDSIDE ASSESSMENT ADULT - SWALLOW EVAL: BUCCAL STRENGTH AND MOBILITY
Pt p/w oral-pharyngeal dysphagia c/b weak labial seal, impaired bolus formation, slow A-P transport, oral stasis, premature spillage of bolus to the pharynx, delayed swallow reflex, poor laryngeal elevation; overt s&s of penetration/aspiration seen on thin liquids.

## 2017-10-30 NOTE — DIETITIAN INITIAL EVALUATION ADULT. - PROBLEM SELECTOR PLAN 1
-Poor oral hygiene, advanced age, bilateral rhonchi Rt>Lt, concern for aspiration PNA.  -Starting Rocephin, zithro, and Flagyl  -NPO for now  -Speech and swallow eval, aspiration precaution  -Check urine legionella and streptococcal Ag  -F/u blood culture  -Gentle hydration as patient has h/o CHF; D5+1/2 NS at 60cc/hr  -ID Dr. Clancy

## 2017-10-30 NOTE — SWALLOW BEDSIDE ASSESSMENT ADULT - ASR SWALLOW REFERRAL
occupational therapy/Registered Dietitian/Malnutrition risk, likely that Pt will not be able to maintain nutrition/hydration by PO means alone./physical therapy

## 2017-10-30 NOTE — PROGRESS NOTE ADULT - ASSESSMENT
Patient is a 89y old  Male who presents with a chief complaint of lethargy, is admitted to the medicine floor for PNA likely from community acquired vs aspiration  and possible underlying chf. Patient is a 89 year old  Male who presents with a chief complaint of lethargy, is admitted to the medicine floor for PNA likely from community acquired vs aspiration  and possible underlying chf.

## 2017-10-30 NOTE — SWALLOW BEDSIDE ASSESSMENT ADULT - SWALLOW EVAL: DIAGNOSIS
Pt p/w oral-pharyngeal dysphagia c/b weak labial seal, impaired bolus formation, slow A-P transport, oral stasis, premature spillage of bolus to the pharynx, delayed swallow reflex, poor laryngeal elevation; overt s&s of penetration/aspiration seen on thin liquids. Pt p/w oral-pharyngeal dysphagia c/b weak labial seal, impaired bolus formation, slow A-P transport, oral stasis, premature spillage of bolus to the pharynx, delayed swallow reflex, poor laryngeal elevation, multiple swallows palpated; overt s&s of penetration/aspiration seen on thin liquids.

## 2017-10-30 NOTE — CONSULT NOTE ADULT - PROBLEM SELECTOR RECOMMENDATION 4
Met with HCP Alejandra Henry and her son Ignacio Henry.  They want me to read his living will before making any decisions regarding his code status.  They will bring it in tomorrow.  Discussed risks/benefits of CPR and artificial life support, give his age and debility.  For now they want to keep him full code.  The plan will be for him to return home with 24/7 aides, once he is medically stable.

## 2017-10-30 NOTE — CONSULT NOTE ADULT - SUBJECTIVE AND OBJECTIVE BOX
HPI:  89 years old male from home,  walks with walker intermittently, has HHA 24/7, minimally verbal, with PMH of CAD s/p triple bypass surgery. 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 Patient is very lethargic and not following commands. He apparently was more awake and alert earlier today when being cleaned. The pt is not responsive at all just now and so unable to get any history from him.      PAST MEDICAL & SURGICAL HISTORY:  CHF (congestive heart failure)  BPH (benign prostatic hyperplasia)  CAD (coronary artery disease)  Bradycardia  S/P CABG (coronary artery bypass graft)      No Known Allergies      Meds:  acetylcysteine 10% Inhalation 4 milliLiter(s) Inhalation every 6 hours  ALBUTerol/ipratropium for Nebulization 3 milliLiter(s) Nebulizer every 6 hours  aspirin Suppository 300 milliGRAM(s) Rectal daily  azithromycin  IVPB      azithromycin  IVPB 500 milliGRAM(s) IV Intermittent every 24 hours  cefTRIAXone   IVPB 1 Gram(s) IV Intermittent every 24 hours  dextrose 5% + sodium chloride 0.45%. 1000 milliLiter(s) IV Continuous <Continuous>  heparin  Injectable 5000 Unit(s) SubCutaneous every 8 hours  influenza   Vaccine 0.5 milliLiter(s) IntraMuscular once  insulin regular  human corrective regimen sliding scale   SubCutaneous every 6 hours  metroNIDAZOLE  IVPB 500 milliGRAM(s) IV Intermittent every 8 hours      SOCIAL HISTORY: unknown  Smoker:    ETOH use:       FAMILY HISTORY:  No pertinent family history in first degree relatives      VITALS:  Vital Signs Last 24 Hrs  T(C): 36.4 (30 Oct 2017 05:10), Max: 36.4 (29 Oct 2017 20:40)  T(F): 97.5 (30 Oct 2017 05:10), Max: 97.6 (29 Oct 2017 20:40)  HR: 84 (30 Oct 2017 05:10) (78 - 84)  BP: 133/85 (30 Oct 2017 05:10) (130/80 - 133/85)  BP(mean): --  RR: 18 (30 Oct 2017 05:10) (18 - 19)  SpO2: 96% (30 Oct 2017 05:10) (95% - 96%)    LABS/DIAGNOSTIC TESTS:                          12.0   8.8   )-----------( 149      ( 30 Oct 2017 07:20 )             38.0     WBC Count: 8.8 K/uL (10-30 @ 07:20)  WBC Count: 7.3 K/uL (10-29 @ 05:51)  WBC Count: 7.7 K/uL (10-29 @ 01:14)      10-30    149<H>  |  113<H>  |  35<H>  ----------------------------<  135<H>  3.7   |  26  |  1.85<H>    Ca    9.4      30 Oct 2017 07:20  Phos  3.2     10-30  Mg     2.7     10-30    TPro  7.2  /  Alb  3.2<L>  /  TBili  0.8  /  DBili  x   /  AST  17  /  ALT  26  /  AlkPhos  95  10-29      Urinalysis Basic - ( 29 Oct 2017 01:58 )    Color: Yellow / Appearance: Clear / S.025 / pH: x  Gluc: x / Ketone: Trace  / Bili: Negative / Urobili: Negative   Blood: x / Protein: 500 mg/dL / Nitrite: Negative   Leuk Esterase: Negative / RBC: 2-5 /HPF / WBC 0-2 /HPF   Sq Epi: x / Non Sq Epi: Few /HPF / Bacteria: x        LIVER FUNCTIONS - ( 29 Oct 2017 01:14 )  Alb: 3.2 g/dL / Pro: 7.2 g/dL / ALK PHOS: 95 U/L / ALT: 26 U/L DA / AST: 17 U/L / GGT: x             PT/INR - ( 29 Oct 2017 01:14 )   PT: 14.6 sec;   INR: 1.33 ratio         PTT - ( 29 Oct 2017 01:14 )  PTT:34.0 sec    LACTATE:    ABG -     CULTURES:   .Urine Catheterized  10-29 @ 12:23   No growth  --  --      .Blood Blood-Peripheral  10-29 @ 12:16   Growth in aerobic bottle: Gram Positive Cocci in Clusters  ***Blood Panel PCR results on this specimen are available  approximately 3 hours after the Gram stain result.***  Gram stain, PCR, and/or culture results may not always  correspond due to difference in methodologies.  ************************************************************  This PCR assay was performed using [employer].  The following targets are tested for: Enterococcus,  vancomycin resistant enterococci, Listeria monocytogenes,  coagulase negative staphylococci, S. aureus,  methicillin resistant S. aureus, Streptococcus agalactiae  (Group B), S. pneumoniae, S. pyogenes (Group A),  Acinetobacter baumannii, Enterobacter cloacae, E. coli,  Klebsiella oxytoca, K. pneumoniae, Proteus sp.,  Serratia marcescens, Haemophilus influenzae,  Neisseria meningitidis, Pseudomonas aeruginosa, Candida  albicans, C. glabrata, C krusei, C parapsilosis,  C. tropicalis and the KPC resistance gene.  --  Blood Culture PCR          RADIOLOGY:< from: Xray Chest 1 View AP- PORTABLE-Urgent (10.28.17 @ 23:57) >    EXAM:  CHEST-PORTABLE URGENT                            PROCEDURE DATE:  10/28/2017          INTERPRETATION:  CLINICAL INDICATION: Cough    Portable frontal view of the chest is submitted.    COMPARISON: Chest x-ray 2011    FINDINGS: Cardiomegaly. Sternotomy wires and mediastinal clips from prior   CABG noted. Bilateral lower lobe hazy opacities compatible pleural   effusions. Underlying airspace disease not occluded. Central pulmonary   venous congestion. Costophrenic angles are sharp. Theosseous structures   are grossly unremarkable.    IMPRESSION:     Cardiomegaly and bilateral pleural effusions with mild central pulmonary   venous congestion.        < end of copied text >        ROS  [x  ] UNABLE TO ELICIT

## 2017-10-30 NOTE — SWALLOW BEDSIDE ASSESSMENT ADULT - ORAL PREPARATORY PHASE
Anterior loss of bolus/Reduced oral grading/Lateral loss of bolus Anterior loss of bolus/Refuses to accept bolus into oral cavity Reduced oral grading/Lateral loss of bolus

## 2017-10-30 NOTE — SWALLOW BEDSIDE ASSESSMENT ADULT - SWALLOW EVAL: RECOMMENDED FEEDING/EATING TECHNIQUES
allow for swallow between intakes/oral hygiene/small sips/bites/check mouth frequently for oral residue/pocketing/position upright (90 degrees)/alternate food with liquid

## 2017-10-30 NOTE — SWALLOW BEDSIDE ASSESSMENT ADULT - SLP GENERAL OBSERVATIONS
Pt only accepted liquid trials throughout exam, refused PO trial of puree despite given verbal cues & encouragement from nephew; pt noted to flail arms when approached with spoon.

## 2017-10-30 NOTE — CONSULT NOTE ADULT - SUBJECTIVE AND OBJECTIVE BOX
HPI:  89 years old male from home, lives alone, walks with walker intermittently, has HHA 24/7, minimally verbal, with PMH of CAD s/p triple bypass surgery. 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 Patient is very lethargic and not following commands well, so history was given by the HCP Ms. Henry and her son at the bedside. Patient does not have direct family members. Per Mr. Keshav Henry who visits pt's home regularly, patient looked tired than usual on last Saturday(1 week ago) so he recommended he visit his PMD, however patient got upset and refused it. Patient was still able to walk with walker that time. 4 days ago(This Tuesday) he visited patient again, patient looked still bit lethargic however he was able to eat his regular diet. Last night pt's aid reported him that patient could not tolerate oral intake and had to give him pureed food (which is unusual), so Mr. Henry called EMS and brought the patient to ED for further evaluation. Patient has had chronic cough for the last few years, which also got worse with 'gurgling sound' for the same period of time. No reported fever from HHA, no other reported symptoms such as chest pain, nausea, vomiting, SOB, diarrhea, etc.     In ED, pt is hypertensive 160-170/100, O2 sat 100% with NC, labs significant for hypernatremia of 148 with BUN/Cr 34/2.00, pro-BNP 01680, troponin 0.142, CXR bilateral patchy opacities suspicious for multifocal PNA, Rt>Lt. Bilateral rhochi was appreciated on physical exam. EKG afib with rate controlled, unsure if patient had Afib before. Patient is admitted to the medicine floor for suspected aspiration PNA vs CAP.     Of note, according to pt's HCP form(Ms. Henry has it) it was written as: I do not wish my life to be prolonged with mechanical respiration, tube feeding, or any other life support, if I should be in an incurable or irreversible mental or physical condition with no reasonable expectation of recovery. Discussed with HCP and her son at the bedside as patient does not have medical capacity at this moment. It is unsure if his PNA is 'incurable or irreversible' condition that was defined by his HCP form, so he will remain in full code for now, and they agreed with it.  HCP phone #:  [phone] (29 Oct 2017 02:22)      PAST MEDICAL & SURGICAL HISTORY:  CHF (congestive heart failure)  BPH (benign prostatic hyperplasia)  CAD (coronary artery disease)  Bradycardia  S/P CABG (coronary artery bypass graft)      SOCIAL HISTORY:    Admitted from:  home assisted living [facility]   Substance abuse history:              Tobacco hx:                  Alcohol hx:              Home Opioid hx:  Scientology:                                    Preferred Language:    Surrogate/HCP/Guardian:            Phone#:    FAMILY HISTORY:  No pertinent family history in first degree relatives    Baseline ADLs (prior to admission):    Allergies    No Known Allergies    Intolerances      Present Symptoms:   Dyspnea:   Nausea/Vomiting:   Anxiety:  Depressed   Fatigue:  Loss of appetite:   Pain:                                location:          Review of Systems: [All others negative or Unable to obtain due to poor mentation]    MEDICATIONS  (STANDING):  acetylcysteine 10% Inhalation 4 milliLiter(s) Inhalation every 6 hours  ALBUTerol/ipratropium for Nebulization 3 milliLiter(s) Nebulizer every 6 hours  aspirin Suppository 300 milliGRAM(s) Rectal daily  azithromycin  IVPB      azithromycin  IVPB 500 milliGRAM(s) IV Intermittent every 24 hours  cefTRIAXone   IVPB 1 Gram(s) IV Intermittent every 24 hours  dextrose 5% + sodium chloride 0.45%. 1000 milliLiter(s) (60 mL/Hr) IV Continuous <Continuous>  heparin  Injectable 5000 Unit(s) SubCutaneous every 8 hours  influenza   Vaccine 0.5 milliLiter(s) IntraMuscular once  insulin regular  human corrective regimen sliding scale   SubCutaneous every 6 hours  metroNIDAZOLE  IVPB 500 milliGRAM(s) IV Intermittent every 8 hours  vancomycin  IVPB 1000 milliGRAM(s) IV Intermittent once    MEDICATIONS  (PRN):      PHYSICAL EXAM:    Vital Signs Last 24 Hrs  T(C): 36.4 (30 Oct 2017 05:10), Max: 36.6 (29 Oct 2017 14:25)  T(F): 97.5 (30 Oct 2017 05:10), Max: 97.8 (29 Oct 2017 14:25)  HR: 84 (30 Oct 2017 05:10) (78 - 88)  BP: 133/85 (30 Oct 2017 05:10) (130/80 - 146/83)  BP(mean): --  RR: 18 (30 Oct 2017 05:10) (18 - 21)  SpO2: 96% (30 Oct 2017 05:10) (95% - 98%)    General: alert  oriented x ____    [lethargic distressed cachexia  nonverbal  unarousable verbal]  Karnofsky Performance Score/Palliative Performance Status Version2:     %    HEENT: normal  dry mouth  ET tube/trach oral lesions:  Lungs: comfortable tachypnea/labored breathing  excessive secretions  CV: normal  tachycardia  GI: normal  distended  tender  incontinent               PEG/NG/OG tube  constipation  last BM:   : normal  incontinent  oliguria/anuria  melchor  Musculoskeletal: normal  weakness  edema             ambulatory  bedbound/wheelchair bound  Skin: normal  pressure ulcers: stage: edema: other:  Neuro: no deficits cognitive impairment dsyphagia/dysarthria paresis: other:  Oral intake ability: unable/only mouth care [minimal moderate full capability]  Diet: [NPO]    LABS:                        12.0   8.8   )-----------( 149      ( 30 Oct 2017 07:20 )             38.0     10-30    149<H>  |  113<H>  |  35<H>  ----------------------------<  135<H>  3.7   |  26  |  1.85<H>    Ca    9.4      30 Oct 2017 07:20  Phos  3.2     10-30  Mg     2.7     10-30    TPro  7.2  /  Alb  3.2<L>  /  TBili  0.8  /  DBili  x   /  AST  17  /  ALT  26  /  AlkPhos  95  10-29    Urinalysis Basic - ( 29 Oct 2017 01:58 )    Color: Yellow / Appearance: Clear / S.025 / pH: x  Gluc: x / Ketone: Trace  / Bili: Negative / Urobili: Negative   Blood: x / Protein: 500 mg/dL / Nitrite: Negative   Leuk Esterase: Negative / RBC: 2-5 /HPF / WBC 0-2 /HPF   Sq Epi: x / Non Sq Epi: Few /HPF / Bacteria: x        RADIOLOGY & ADDITIONAL STUDIES:    ADVANCE DIRECTIVES:   Advanced Care Planning discussion total time spent: HPI:  89 yr old male from home, with PMH of CAD s/p triple bypass surgery. CHF on diuretics, bradycardia (PPM was recommended in remote past but patient refused it), BPH, ?CKD, and glaucoma came to ED c/o lethargy and generalized weakness for about a week.     PAST MEDICAL & SURGICAL HISTORY:  CHF (congestive heart failure)  BPH (benign prostatic hyperplasia)  CAD (coronary artery disease)  Bradycardia  S/P CABG (coronary artery bypass graft)    SOCIAL HISTORY:    Admitted from:  home, has / help   Substance abuse history:              Tobacco hx:  Denies                Alcohol hx: Denies              Home Opioid hx: Denies  Sikhism:  Caodaism                                  Preferred Language: English    Surrogate/HCP/Guardian: Alejandra saini Ignacio Elaine (Family)           Phone#: [phone]    FAMILY HISTORY:  No pertinent family history in first degree relatives    Baseline ADLs (prior to admission): Ambulated with walker with assistance    Allergies:  No Known Allergies          Review of Systems: Unable to obtain due to poor mentation    MEDICATIONS  (STANDING):  acetylcysteine 10% Inhalation 4 milliLiter(s) Inhalation every 6 hours  ALBUTerol/ipratropium for Nebulization 3 milliLiter(s) Nebulizer every 6 hours  aspirin Suppository 300 milliGRAM(s) Rectal daily  azithromycin  IVPB      azithromycin  IVPB 500 milliGRAM(s) IV Intermittent every 24 hours  cefTRIAXone   IVPB 1 Gram(s) IV Intermittent every 24 hours  dextrose 5% + sodium chloride 0.45%. 1000 milliLiter(s) (60 mL/Hr) IV Continuous <Continuous>  heparin  Injectable 5000 Unit(s) SubCutaneous every 8 hours  influenza   Vaccine 0.5 milliLiter(s) IntraMuscular once  insulin regular  human corrective regimen sliding scale   SubCutaneous every 6 hours  metroNIDAZOLE  IVPB 500 milliGRAM(s) IV Intermittent every 8 hours  vancomycin  IVPB 1000 milliGRAM(s) IV Intermittent once    PHYSICAL EXAM:    Vital Signs Last 24 Hrs  T(C): 36.4 (30 Oct 2017 05:10), Max: 36.6 (29 Oct 2017 14:25)  T(F): 97.5 (30 Oct 2017 05:10), Max: 97.8 (29 Oct 2017 14:25)  HR: 84 (30 Oct 2017 05:10) (78 - 88)  BP: 133/85 (30 Oct 2017 05:10) (130/80 - 146/83)  RR: 18 (30 Oct 2017 05:10) (18 - 21)  SpO2: 96% (30 Oct 2017 05:10) (95% - 98%)    General: alert  oriented x __1__    [lethargic, cachexia, minimally verbal  Karnofsky Performance Score/Palliative Performance Status Version2:    30 %    HEENT: Dry Mouth  Lungs: tachypnea/labored breathing  CV: normal  GI: Incontinent  : Incontinent  Musculoskeletal: Weakness, bedbound/wheelchair bound   Skin: normal  Neuro: cognitive impairment  Oral intake ability: Minimal  Diet: Dysphagia    LABS:                        12.0   8.8   )-----------( 149      ( 30 Oct 2017 07:20 )             38.0     10-30    149<H>  |  113<H>  |  35<H>  ----------------------------<  135<H>  3.7   |  26  |  1.85<H>    Ca    9.4      30 Oct 2017 07:20  Phos  3.2     10-30  Mg     2.7     10-30    TPro  7.2  /  Alb  3.2<L>  /  TBili  0.8  /  DBili  x   /  AST  17  /  ALT  26  /  AlkPhos  95  10-29    Urinalysis Basic - ( 29 Oct 2017 01:58 )    Color: Yellow / Appearance: Clear / S.025 / pH: x  Gluc: x / Ketone: Trace  / Bili: Negative / Urobili: Negative   Blood: x / Protein: 500 mg/dL / Nitrite: Negative   Leuk Esterase: Negative / RBC: 2-5 /HPF / WBC 0-2 /HPF   Sq Epi: x / Non Sq Epi: Few /HPF / Bacteria: x    RADIOLOGY & ADDITIONAL STUDIES:    ADVANCE DIRECTIVES:   Advanced Care Planning discussion total time spent: HPI:  89 yr old male from home, with PMH of CAD s/p triple bypass surgery. CHF on diuretics, bradycardia (PPM was recommended in remote past but patient refused it), BPH, ?CKD, and glaucoma came to ED c/o lethargy and generalized weakness for about a week, found to have PNA.  Palliative consult called to discuss goals of care.      PAST MEDICAL & SURGICAL HISTORY:  CHF (congestive heart failure)  BPH (benign prostatic hyperplasia)  CAD (coronary artery disease)  Bradycardia  S/P CABG (coronary artery bypass graft)    SOCIAL HISTORY:    Admitted from:  home, has  help   Substance abuse history:              Tobacco hx:  Denies                Alcohol hx: Denies              Home Opioid hx: Denies  Quaker:  Pentecostalism                                  Preferred Language: English    Surrogate/HCP/Guardian: Betty Henry (Family)           Phone#: [phone]    FAMILY HISTORY:  No pertinent family history in first degree relatives    Baseline ADLs (prior to admission): Ambulated with walker with assistance    Allergies:  No Known Allergies          Review of Systems: Unable to obtain due to poor mentation    MEDICATIONS  (STANDING):  acetylcysteine 10% Inhalation 4 milliLiter(s) Inhalation every 6 hours  ALBUTerol/ipratropium for Nebulization 3 milliLiter(s) Nebulizer every 6 hours  aspirin Suppository 300 milliGRAM(s) Rectal daily  azithromycin  IVPB      azithromycin  IVPB 500 milliGRAM(s) IV Intermittent every 24 hours  cefTRIAXone   IVPB 1 Gram(s) IV Intermittent every 24 hours  dextrose 5% + sodium chloride 0.45%. 1000 milliLiter(s) (60 mL/Hr) IV Continuous <Continuous>  heparin  Injectable 5000 Unit(s) SubCutaneous every 8 hours  influenza   Vaccine 0.5 milliLiter(s) IntraMuscular once  insulin regular  human corrective regimen sliding scale   SubCutaneous every 6 hours  metroNIDAZOLE  IVPB 500 milliGRAM(s) IV Intermittent every 8 hours  vancomycin  IVPB 1000 milliGRAM(s) IV Intermittent once    PHYSICAL EXAM:    Vital Signs Last 24 Hrs  T(C): 36.4 (30 Oct 2017 05:10), Max: 36.6 (29 Oct 2017 14:25)  T(F): 97.5 (30 Oct 2017 05:10), Max: 97.8 (29 Oct 2017 14:25)  HR: 84 (30 Oct 2017 05:10) (78 - 88)  BP: 133/85 (30 Oct 2017 05:10) (130/80 - 146/83)  RR: 18 (30 Oct 2017 05:10) (18 - 21)  SpO2: 96% (30 Oct 2017 05:10) (95% - 98%)    General: alert  oriented x __1__    [lethargic, cachexia, minimally verbal  Karnofsky Performance Score/Palliative Performance Status Version2:    30 %    HEENT: Dry Mouth  Lungs: tachypnea/labored breathing  CV: normal  GI: Incontinent  : Incontinent  Musculoskeletal: Weakness, bedbound/wheelchair bound   Skin: normal  Neuro: cognitive impairment  Oral intake ability: Minimal  Diet: Dysphagia    LABS:                        12.0   8.8   )-----------( 149      ( 30 Oct 2017 07:20 )             38.0     10-30    149<H>  |  113<H>  |  35<H>  ----------------------------<  135<H>  3.7   |  26  |  1.85<H>    Ca    9.4      30 Oct 2017 07:20  Phos  3.2     10-30  Mg     2.7     10-30    TPro  7.2  /  Alb  3.2<L>  /  TBili  0.8  /  DBili  x   /  AST  17  /  ALT  26  /  AlkPhos  95  10-29    Urinalysis Basic - ( 29 Oct 2017 01:58 )    Color: Yellow / Appearance: Clear / S.025 / pH: x  Gluc: x / Ketone: Trace  / Bili: Negative / Urobili: Negative   Blood: x / Protein: 500 mg/dL / Nitrite: Negative   Leuk Esterase: Negative / RBC: 2-5 /HPF / WBC 0-2 /HPF   Sq Epi: x / Non Sq Epi: Few /HPF / Bacteria: x    RADIOLOGY & ADDITIONAL STUDIES: < from: Xray Chest 1 View AP- PORTABLE-Urgent (10.28.17 @ 23:57) >  IMPRESSION:     Cardiomegaly and bilateral pleural effusions with mild central pulmonary   venous congestion.    < end of copied text >      ADVANCE DIRECTIVES: Full code, has HCP and Living will  Advanced Care Planning discussion total time spent:  30 minutes HPI:  89 yr old male from home, with PMH of CAD s/p triple bypass surgery. CHF on diuretics, bradycardia (PPM was recommended in remote past but patient refused it), BPH, ?CKD, and glaucoma came to ED c/o lethargy and generalized weakness for about a week, found to have possible PNA.  Palliative consult called to discuss goals of care.      PAST MEDICAL & SURGICAL HISTORY:  CHF (congestive heart failure)  BPH (benign prostatic hyperplasia)  CAD (coronary artery disease)  Bradycardia  S/P CABG (coronary artery bypass graft)    SOCIAL HISTORY:    Admitted from:  home, has  help   Substance abuse history:              Tobacco hx:  Denies                Alcohol hx: Denies              Home Opioid hx: Denies  Buddhism:  Taoist                                  Preferred Language: English    Surrogate/HCP/Guardian: Betty Henry (Family)           Phone#: [phone]    FAMILY HISTORY:  No pertinent family history in first degree relatives    Baseline ADLs (prior to admission): Ambulated with walker with assistance    Allergies:  No Known Allergies          Review of Systems: Unable to obtain due to poor mentation    MEDICATIONS  (STANDING):  acetylcysteine 10% Inhalation 4 milliLiter(s) Inhalation every 6 hours  ALBUTerol/ipratropium for Nebulization 3 milliLiter(s) Nebulizer every 6 hours  aspirin Suppository 300 milliGRAM(s) Rectal daily  azithromycin  IVPB      azithromycin  IVPB 500 milliGRAM(s) IV Intermittent every 24 hours  cefTRIAXone   IVPB 1 Gram(s) IV Intermittent every 24 hours  dextrose 5% + sodium chloride 0.45%. 1000 milliLiter(s) (60 mL/Hr) IV Continuous <Continuous>  heparin  Injectable 5000 Unit(s) SubCutaneous every 8 hours  influenza   Vaccine 0.5 milliLiter(s) IntraMuscular once  insulin regular  human corrective regimen sliding scale   SubCutaneous every 6 hours  metroNIDAZOLE  IVPB 500 milliGRAM(s) IV Intermittent every 8 hours  vancomycin  IVPB 1000 milliGRAM(s) IV Intermittent once    PHYSICAL EXAM:    Vital Signs Last 24 Hrs  T(C): 36.4 (30 Oct 2017 05:10), Max: 36.6 (29 Oct 2017 14:25)  T(F): 97.5 (30 Oct 2017 05:10), Max: 97.8 (29 Oct 2017 14:25)  HR: 84 (30 Oct 2017 05:10) (78 - 88)  BP: 133/85 (30 Oct 2017 05:10) (130/80 - 146/83)  RR: 18 (30 Oct 2017 05:10) (18 - 21)  SpO2: 96% (30 Oct 2017 05:10) (95% - 98%)    General: alert  oriented x __1__    [lethargic, cachexia, minimally verbal  Karnofsky Performance Score/Palliative Performance Status Version2:    30 %    HEENT: Dry Mouth  Lungs: tachypnea/labored breathing  CV: normal  GI: Incontinent  : Incontinent  Musculoskeletal: Weakness, bedbound/wheelchair bound   Skin: normal  Neuro: cognitive impairment  Oral intake ability: Minimal  Diet: Dysphagia    LABS:                        12.0   8.8   )-----------( 149      ( 30 Oct 2017 07:20 )             38.0     10-30    149<H>  |  113<H>  |  35<H>  ----------------------------<  135<H>  3.7   |  26  |  1.85<H>    Ca    9.4      30 Oct 2017 07:20  Phos  3.2     10-30  Mg     2.7     10-30    TPro  7.2  /  Alb  3.2<L>  /  TBili  0.8  /  DBili  x   /  AST  17  /  ALT  26  /  AlkPhos  95  10-29    Urinalysis Basic - ( 29 Oct 2017 01:58 )    Color: Yellow / Appearance: Clear / S.025 / pH: x  Gluc: x / Ketone: Trace  / Bili: Negative / Urobili: Negative   Blood: x / Protein: 500 mg/dL / Nitrite: Negative   Leuk Esterase: Negative / RBC: 2-5 /HPF / WBC 0-2 /HPF   Sq Epi: x / Non Sq Epi: Few /HPF / Bacteria: x    RADIOLOGY & ADDITIONAL STUDIES: < from: Xray Chest 1 View AP- PORTABLE-Urgent (10.28.17 @ 23:57) >  IMPRESSION:     Cardiomegaly and bilateral pleural effusions with mild central pulmonary   venous congestion.    < end of copied text >      ADVANCE DIRECTIVES: Full code, has HCP and Living will  Advanced Care Planning discussion total time spent:  30 minutes

## 2017-10-30 NOTE — PROGRESS NOTE ADULT - PROBLEM SELECTOR PLAN 1
b/l eryn  cxr shows possible infiltrate vs pulmonary edema  c/w iv Rocephin azithromycin and falgyl   f/u ECHO  f/u proBNP  f/u procalcitonin  c/w nebs  f/u ID evaluation  c/w iv fluids at low rate with NPO

## 2017-10-30 NOTE — PROGRESS NOTE ADULT - ATTENDING COMMENTS
Patient seen and examined. Patient's history, vitals, labs, imaging studies reviewed. Discussed with above resident, agree with note with edits.  Palliative care consult.  Dinorah Felix MD  10/30/2017

## 2017-10-30 NOTE — SWALLOW BEDSIDE ASSESSMENT ADULT - COMMENTS
Pt HOB elevated to 45 degrees, awake but drowsy, [place], visually impaired, cachexia apparent, uncooperative, unable to follow directives, attempted to respond to queries from nephew; Pt's nephew present & provided historical information (i.e., prior diet & PO intake).

## 2017-10-30 NOTE — DIETITIAN INITIAL EVALUATION ADULT. - MD RECOMMEND
po supplement/Advance diet with nutrition supplement, pending Speech & Swallow evaluation outcome, as medically feasible

## 2017-10-30 NOTE — CONSULT NOTE ADULT - PROBLEM SELECTOR RECOMMENDATION 2
secondary to PNA vs pulmonary edema; on antibiotics, as per primary team; may benefit from adding opioid if dyspnea worsens

## 2017-10-30 NOTE — DIETITIAN INITIAL EVALUATION ADULT. - OTHER INFO
Nutrition consult requested for failure to thrive. Patient from home lives alone with HHA 24/7. Visited pt. alert but confused, poor historian, pt. presently NPO with IV Fluids infusing, awaiting Speech & Swallow evaluation, pt. followed by Palliative Consult Care/team, skin intact. D/W ANNETTE.

## 2017-10-30 NOTE — DIETITIAN INITIAL EVALUATION ADULT. - FACTORS AFF FOOD INTAKE
change in mental status/difficulty with food procurement/preparation/other (specify)/difficulty chewing/difficulty feeding self/difficulty swallowing/Aspiration, weakness/lethargic, deaf, cardio-comorbidities

## 2017-10-30 NOTE — SWALLOW BEDSIDE ASSESSMENT ADULT - SLP PERTINENT HISTORY OF CURRENT PROBLEM
89 year old  Male who presents with a chief complaint of lethargy, is admitted to the medicine floor for PNA likely from community acquired vs aspiration  and possible underlying CHF. hest XR (+) Cardiomegaly and bilateral pleural effusions with mild central pulmonary venous congestion.

## 2017-10-30 NOTE — DIETITIAN INITIAL EVALUATION ADULT. - NS AS NUTRI INTERV COLLABORAT
Collaboration with other nutrition professionals/Speech & Swallow evaluation, as appropriate/Collaboration with other providers

## 2017-10-30 NOTE — PROGRESS NOTE ADULT - SUBJECTIVE AND OBJECTIVE BOX
Patient is a 89y old  Male who presents with a chief complaint of lethargy (29 Oct 2017 02:22)      INTERVAL HPI/OVERNIGHT EVENTS:no acute over events overnight patient still has SOB     MEDICATIONS  (STANDING):  acetylcysteine 10% Inhalation 4 milliLiter(s) Inhalation every 6 hours  ALBUTerol/ipratropium for Nebulization 3 milliLiter(s) Nebulizer every 6 hours  aspirin Suppository 300 milliGRAM(s) Rectal daily  azithromycin  IVPB      azithromycin  IVPB 500 milliGRAM(s) IV Intermittent every 24 hours  cefTRIAXone   IVPB 1 Gram(s) IV Intermittent every 24 hours  dextrose 5% + sodium chloride 0.45%. 1000 milliLiter(s) (60 mL/Hr) IV Continuous <Continuous>  heparin  Injectable 5000 Unit(s) SubCutaneous every 8 hours  influenza   Vaccine 0.5 milliLiter(s) IntraMuscular once  insulin regular  human corrective regimen sliding scale   SubCutaneous every 6 hours  metroNIDAZOLE  IVPB 500 milliGRAM(s) IV Intermittent every 8 hours  vancomycin  IVPB 1000 milliGRAM(s) IV Intermittent once    MEDICATIONS  (PRN):      Allergies    No Known Allergies    Intolerances        REVIEW OF SYSTEMS:  unable to elicit as patient is non verbal and unable to communicate   Vital Signs Last 24 Hrs  T(C): 36.4 (30 Oct 2017 05:10), Max: 36.6 (29 Oct 2017 14:25)  T(F): 97.5 (30 Oct 2017 05:10), Max: 97.8 (29 Oct 2017 14:25)  HR: 84 (30 Oct 2017 05:10) (78 - 88)  BP: 133/85 (30 Oct 2017 05:10) (130/80 - 146/83)  BP(mean): --  RR: 18 (30 Oct 2017 05:10) (18 - 21)  SpO2: 96% (30 Oct 2017 05:10) (95% - 98%)    PHYSICAL EXAM:  GENERAL: NAD, well-groomed, well-developed  HEAD:  Atraumatic, Normocephalic  EYES: EOMI, PERRLA,  NECK: Supple, No JVD,  CHEST/LUNG:b/l ronchi harsh R>l  HEART: Regular rate and rhythm; No murmurs, rubs, or gallops  ABDOMEN: Soft, Nontender, Nondistended; Bowel sounds present  NERVOUS SYSTEM:  Alert But not oriented ,  EXTREMITIES:  1 plus pitting edema   SKIN;    LABS:                        12.0   8.8   )-----------( 149      ( 30 Oct 2017 07:20 )             38.0     10-30    149<H>  |  113<H>  |  35<H>  ----------------------------<  135<H>  3.7   |  26  |  1.85<H>    Ca    9.4      30 Oct 2017 07:20  Phos  3.2     10-30  Mg     2.7     10-30    TPro  7.2  /  Alb  3.2<L>  /  TBili  0.8  /  DBili  x   /  AST  17  /  ALT  26  /  AlkPhos  95  10-    PT/INR - ( 29 Oct 2017 01:14 )   PT: 14.6 sec;   INR: 1.33 ratio         PTT - ( 29 Oct 2017 01:14 )  PTT:34.0 sec  Urinalysis Basic - ( 29 Oct 2017 01:58 )    Color: Yellow / Appearance: Clear / S.025 / pH: x  Gluc: x / Ketone: Trace  / Bili: Negative / Urobili: Negative   Blood: x / Protein: 500 mg/dL / Nitrite: Negative   Leuk Esterase: Negative / RBC: 2-5 /HPF / WBC 0-2 /HPF   Sq Epi: x / Non Sq Epi: Few /HPF / Bacteria: x      CAPILLARY BLOOD GLUCOSE  115 (30 Oct 2017 00:22)      POCT Blood Glucose.: 115 mg/dL (30 Oct 2017 08:57)  POCT Blood Glucose.: 129 mg/dL (30 Oct 2017 06:01)  POCT Blood Glucose.: 115 mg/dL (30 Oct 2017 00:20)      RADIOLOGY & ADDITIONAL TESTS:    Imaging Personally Reviewed:  [ ] YES  [ ] NO    Consultant(s) Notes Reviewed:  [ ] YES  [ ] NO Patient is a 89y old  Male who presents with a chief complaint of lethargy (29 Oct 2017 02:22)      INTERVAL HPI/OVERNIGHT EVENTS: no acute over events overnight patient still has intermittent SOB as per nursing report, patient is awake but non verbal and unable to communicate   MEDICATIONS  (STANDING):  acetylcysteine 10% Inhalation 4 milliLiter(s) Inhalation every 6 hours  ALBUTerol/ipratropium for Nebulization 3 milliLiter(s) Nebulizer every 6 hours  aspirin Suppository 300 milliGRAM(s) Rectal daily  azithromycin  IVPB      azithromycin  IVPB 500 milliGRAM(s) IV Intermittent every 24 hours  cefTRIAXone   IVPB 1 Gram(s) IV Intermittent every 24 hours  dextrose 5% + sodium chloride 0.45%. 1000 milliLiter(s) (60 mL/Hr) IV Continuous <Continuous>  heparin  Injectable 5000 Unit(s) SubCutaneous every 8 hours  influenza   Vaccine 0.5 milliLiter(s) IntraMuscular once  insulin regular  human corrective regimen sliding scale   SubCutaneous every 6 hours  metroNIDAZOLE  IVPB 500 milliGRAM(s) IV Intermittent every 8 hours  vancomycin  IVPB 1000 milliGRAM(s) IV Intermittent once          Allergies    No Known Allergies            REVIEW OF SYSTEMS:  unable to elicit as patient is non verbal and unable to communicate     Vital Signs Last 24 Hrs  T(C): 36.4 (30 Oct 2017 05:10), Max: 36.6 (29 Oct 2017 14:25)  T(F): 97.5 (30 Oct 2017 05:10), Max: 97.8 (29 Oct 2017 14:25)  HR: 84 (30 Oct 2017 05:10) (78 - 88)  BP: 133/85 (30 Oct 2017 05:10) (130/80 - 146/83)  RR: 18 (30 Oct 2017 05:10) (18 - 21)  SpO2: 96% (30 Oct 2017 05:10) (95% - 98%)    PHYSICAL EXAM:  GENERAL: NAD, well-groomed, well-developed  HEAD:  Atraumatic, Normocephalic  EYES: EOMI, PERRL  NECK: Supple, No JVD,  CHEST/LUNG:b/l ronchi harsh R>l  HEART: Regular rate and rhythm; No murmurs, rubs, or gallops  ABDOMEN: Soft, Nontender, Nondistended; Bowel sounds present  NERVOUS SYSTEM:  Alert But not oriented ,  EXTREMITIES:  1 plus pitting edema   SKIN; no rash    LABS:                        12.0   8.8   )-----------( 149      ( 30 Oct 2017 07:20 )             38.0     10-30    149<H>  |  113<H>  |  35<H>  ----------------------------<  135<H>  3.7   |  26  |  1.85<H>    Ca    9.4      30 Oct 2017 07:20  Phos  3.2     10-30  Mg     2.7     10-30    TPro  7.2  /  Alb  3.2<L>  /  TBili  0.8  /  DBili  x   /  AST  17  /  ALT  26  /  AlkPhos  95  10-    PT/INR - ( 29 Oct 2017 01:14 )   PT: 14.6 sec;   INR: 1.33 ratio         PTT - ( 29 Oct 2017 01:14 )  PTT:34.0 sec  Urinalysis Basic - ( 29 Oct 2017 01:58 )    Color: Yellow / Appearance: Clear / S.025 / pH: x  Gluc: x / Ketone: Trace  / Bili: Negative / Urobili: Negative   Blood: x / Protein: 500 mg/dL / Nitrite: Negative   Leuk Esterase: Negative / RBC: 2-5 /HPF / WBC 0-2 /HPF   Sq Epi: x / Non Sq Epi: Few /HPF / Bacteria: x      CAPILLARY BLOOD GLUCOSE  115 (30 Oct 2017 00:22)      POCT Blood Glucose.: 115 mg/dL (30 Oct 2017 08:57)  POCT Blood Glucose.: 129 mg/dL (30 Oct 2017 06:01)  POCT Blood Glucose.: 115 mg/dL (30 Oct 2017 00:20)      RADIOLOGY & ADDITIONAL TESTS:    Imaging Personally Reviewed:  [x ] YES  [ ] NO    Consultant(s) Notes Reviewed:  [x ] YES  [ ] NO

## 2017-10-31 LAB
ALBUMIN SERPL ELPH-MCNC: 2.6 G/DL — LOW (ref 3.5–5)
ALBUMIN SERPL ELPH-MCNC: 2.7 G/DL — LOW (ref 3.5–5)
ALP SERPL-CCNC: 76 U/L — SIGNIFICANT CHANGE UP (ref 40–120)
ALP SERPL-CCNC: 86 U/L — SIGNIFICANT CHANGE UP (ref 40–120)
ALT FLD-CCNC: 22 U/L DA — SIGNIFICANT CHANGE UP (ref 10–60)
ALT FLD-CCNC: 23 U/L DA — SIGNIFICANT CHANGE UP (ref 10–60)
ANION GAP SERPL CALC-SCNC: 6 MMOL/L — SIGNIFICANT CHANGE UP (ref 5–17)
ANION GAP SERPL CALC-SCNC: 7 MMOL/L — SIGNIFICANT CHANGE UP (ref 5–17)
AST SERPL-CCNC: 16 U/L — SIGNIFICANT CHANGE UP (ref 10–40)
AST SERPL-CCNC: 18 U/L — SIGNIFICANT CHANGE UP (ref 10–40)
BASOPHILS # BLD AUTO: 0 K/UL — SIGNIFICANT CHANGE UP (ref 0–0.2)
BASOPHILS NFR BLD AUTO: 0.6 % — SIGNIFICANT CHANGE UP (ref 0–2)
BILIRUB SERPL-MCNC: 0.5 MG/DL — SIGNIFICANT CHANGE UP (ref 0.2–1.2)
BILIRUB SERPL-MCNC: 0.5 MG/DL — SIGNIFICANT CHANGE UP (ref 0.2–1.2)
BUN SERPL-MCNC: 29 MG/DL — HIGH (ref 7–18)
BUN SERPL-MCNC: 32 MG/DL — HIGH (ref 7–18)
CALCIUM SERPL-MCNC: 8.8 MG/DL — SIGNIFICANT CHANGE UP (ref 8.4–10.5)
CALCIUM SERPL-MCNC: 8.9 MG/DL — SIGNIFICANT CHANGE UP (ref 8.4–10.5)
CHLORIDE SERPL-SCNC: 115 MMOL/L — HIGH (ref 96–108)
CHLORIDE SERPL-SCNC: 116 MMOL/L — HIGH (ref 96–108)
CO2 SERPL-SCNC: 27 MMOL/L — SIGNIFICANT CHANGE UP (ref 22–31)
CO2 SERPL-SCNC: 28 MMOL/L — SIGNIFICANT CHANGE UP (ref 22–31)
CREAT SERPL-MCNC: 1.54 MG/DL — HIGH (ref 0.5–1.3)
CREAT SERPL-MCNC: 1.69 MG/DL — HIGH (ref 0.5–1.3)
CULTURE RESULTS: SIGNIFICANT CHANGE UP
EOSINOPHIL # BLD AUTO: 0.1 K/UL — SIGNIFICANT CHANGE UP (ref 0–0.5)
EOSINOPHIL NFR BLD AUTO: 1.3 % — SIGNIFICANT CHANGE UP (ref 0–6)
GLUCOSE SERPL-MCNC: 139 MG/DL — HIGH (ref 70–99)
GLUCOSE SERPL-MCNC: 99 MG/DL — SIGNIFICANT CHANGE UP (ref 70–99)
HCT VFR BLD CALC: 36.1 % — LOW (ref 39–50)
HGB BLD-MCNC: 11.4 G/DL — LOW (ref 13–17)
LYMPHOCYTES # BLD AUTO: 0.5 K/UL — LOW (ref 1–3.3)
LYMPHOCYTES # BLD AUTO: 7.8 % — LOW (ref 13–44)
MAGNESIUM SERPL-MCNC: 2.6 MG/DL — SIGNIFICANT CHANGE UP (ref 1.6–2.6)
MAGNESIUM SERPL-MCNC: 2.6 MG/DL — SIGNIFICANT CHANGE UP (ref 1.6–2.6)
MCHC RBC-ENTMCNC: 31.6 GM/DL — LOW (ref 32–36)
MCHC RBC-ENTMCNC: 32.1 PG — SIGNIFICANT CHANGE UP (ref 27–34)
MCV RBC AUTO: 101.6 FL — HIGH (ref 80–100)
MONOCYTES # BLD AUTO: 0.6 K/UL — SIGNIFICANT CHANGE UP (ref 0–0.9)
MONOCYTES NFR BLD AUTO: 8.7 % — SIGNIFICANT CHANGE UP (ref 2–14)
NEUTROPHILS # BLD AUTO: 5.4 K/UL — SIGNIFICANT CHANGE UP (ref 1.8–7.4)
NEUTROPHILS NFR BLD AUTO: 81.5 % — HIGH (ref 43–77)
ORGANISM # SPEC MICROSCOPIC CNT: SIGNIFICANT CHANGE UP
ORGANISM # SPEC MICROSCOPIC CNT: SIGNIFICANT CHANGE UP
PHOSPHATE SERPL-MCNC: 2.6 MG/DL — SIGNIFICANT CHANGE UP (ref 2.5–4.5)
PHOSPHATE SERPL-MCNC: 2.8 MG/DL — SIGNIFICANT CHANGE UP (ref 2.5–4.5)
PLATELET # BLD AUTO: 133 K/UL — LOW (ref 150–400)
POTASSIUM SERPL-MCNC: 3.7 MMOL/L — SIGNIFICANT CHANGE UP (ref 3.5–5.3)
POTASSIUM SERPL-MCNC: 3.8 MMOL/L — SIGNIFICANT CHANGE UP (ref 3.5–5.3)
POTASSIUM SERPL-SCNC: 3.7 MMOL/L — SIGNIFICANT CHANGE UP (ref 3.5–5.3)
POTASSIUM SERPL-SCNC: 3.8 MMOL/L — SIGNIFICANT CHANGE UP (ref 3.5–5.3)
PROT SERPL-MCNC: 6.2 G/DL — SIGNIFICANT CHANGE UP (ref 6–8.3)
PROT SERPL-MCNC: 6.4 G/DL — SIGNIFICANT CHANGE UP (ref 6–8.3)
RBC # BLD: 3.55 M/UL — LOW (ref 4.2–5.8)
RBC # FLD: 15 % — HIGH (ref 10.3–14.5)
SODIUM SERPL-SCNC: 149 MMOL/L — HIGH (ref 135–145)
SODIUM SERPL-SCNC: 150 MMOL/L — HIGH (ref 135–145)
SPECIMEN SOURCE: SIGNIFICANT CHANGE UP
WBC # BLD: 6.6 K/UL — SIGNIFICANT CHANGE UP (ref 3.8–10.5)
WBC # FLD AUTO: 6.6 K/UL — SIGNIFICANT CHANGE UP (ref 3.8–10.5)

## 2017-10-31 RX ORDER — LABETALOL HCL 100 MG
10 TABLET ORAL ONCE
Qty: 0 | Refills: 0 | Status: COMPLETED | OUTPATIENT
Start: 2017-10-31 | End: 2017-10-31

## 2017-10-31 RX ORDER — SODIUM CHLORIDE 9 MG/ML
1000 INJECTION, SOLUTION INTRAVENOUS
Qty: 0 | Refills: 0 | Status: DISCONTINUED | OUTPATIENT
Start: 2017-10-31 | End: 2017-11-01

## 2017-10-31 RX ADMIN — Medication 10 MILLIGRAM(S): at 06:38

## 2017-10-31 RX ADMIN — HEPARIN SODIUM 5000 UNIT(S): 5000 INJECTION INTRAVENOUS; SUBCUTANEOUS at 06:38

## 2017-10-31 RX ADMIN — Medication 3 MILLILITER(S): at 15:08

## 2017-10-31 RX ADMIN — Medication 4 MILLILITER(S): at 15:08

## 2017-10-31 RX ADMIN — Medication 3 MILLILITER(S): at 08:14

## 2017-10-31 RX ADMIN — HEPARIN SODIUM 5000 UNIT(S): 5000 INJECTION INTRAVENOUS; SUBCUTANEOUS at 15:24

## 2017-10-31 RX ADMIN — SODIUM CHLORIDE 60 MILLILITER(S): 9 INJECTION, SOLUTION INTRAVENOUS at 13:02

## 2017-10-31 RX ADMIN — Medication 300 MILLIGRAM(S): at 12:48

## 2017-10-31 RX ADMIN — Medication 4 MILLILITER(S): at 20:23

## 2017-10-31 RX ADMIN — HEPARIN SODIUM 5000 UNIT(S): 5000 INJECTION INTRAVENOUS; SUBCUTANEOUS at 22:24

## 2017-10-31 RX ADMIN — MEROPENEM 200 MILLIGRAM(S): 1 INJECTION INTRAVENOUS at 17:26

## 2017-10-31 RX ADMIN — Medication 4 MILLILITER(S): at 08:14

## 2017-10-31 RX ADMIN — Medication 3 MILLILITER(S): at 20:22

## 2017-10-31 RX ADMIN — MEROPENEM 200 MILLIGRAM(S): 1 INJECTION INTRAVENOUS at 06:38

## 2017-10-31 NOTE — CONSULT NOTE ADULT - SUBJECTIVE AND OBJECTIVE BOX
HISTORY OF PRESENT ILLNESS: HPI:  89 years old male from home, lives alone, walks with walker intermittently, has HHA 24/7, minimally verbal, with PMH of CAD s/p triple bypass surgery. CHF on diuretics, afib not on AC bradycardia(PPM was recommended in remote past but patient refused it), BPH, ?CKD, and glaucoma came to ED c/o lethargy and generalized weakness for about a week. Patient is very lethargic and not following commands well, so history was given by the HCP Ms. Henry and her son at the bedside. Patient does not have direct family members. Per Mr. Keshav Henry who visits pt's home regularly, patient looked tired than usual on last Saturday(1 week ago) so he recommended he visit his PMD, however patient got upset and refused it. Patient was still able to walk with walker that time. 4 days ago(This Tuesday) he visited patient again, patient looked still bit lethargic however he was able to eat his regular diet. Last night pt's aid reported him that patient could not tolerate oral intake and had to give him pureed food (which is unusual), so Mr. Henry called EMS and brought the patient to ED for further evaluation. Patient has had chronic cough for the last few years, which also got worse with 'gurgling sound' for the same period of time. No reported fever from HHA, no other reported symptoms such as chest pain, nausea, vomiting, SOB, diarrhea, etc.     In ED, pt is hypertensive 160-170/100, O2 sat 100% with NC, labs significant for hypernatremia of 148 with BUN/Cr 34/2.00, pro-BNP 89701, troponin 0.142, CXR bilateral patchy opacities suspicious for multifocal PNA, Rt>Lt. Bilateral rhochi was appreciated on physical exam. EKG afib with rate controlled, unsure if patient had Afib before. Patient is admitted to the medicine floor for suspected aspiration PNA vs CAP.     Of note, according to pt's HCP form(Ms. Henry has it) it was written as: I do not wish my life to be prolonged with mechanical respiration, tube feeding, or any other life support, if I should be in an incurable or irreversible mental or physical condition with no reasonable expectation of recovery. Discussed with HCP and her son at the bedside as patient does not have medical capacity at this moment. It is unsure if his PNA is 'incurable or irreversible' condition that was defined by his HCP form, so he will remain in full code for now, and they agreed with it.  HCP phone #:  [phone] (29 Oct 2017 02:22)  We have been asked to evaluate him for an EF 10-15% of unknown chronicity.  He is currently not answering questions      PAST MEDICAL & SURGICAL HISTORY:  CHF (congestive heart failure)  BPH (benign prostatic hyperplasia)  CAD (coronary artery disease)  Bradycardia  S/P CABG (coronary artery bypass graft)          MEDICATIONS:  MEDICATIONS  (STANDING):  acetylcysteine 10% Inhalation 4 milliLiter(s) Inhalation every 6 hours  ALBUTerol/ipratropium for Nebulization 3 milliLiter(s) Nebulizer every 6 hours  aspirin Suppository 300 milliGRAM(s) Rectal daily  dextrose 5% + sodium chloride 0.45%. 1000 milliLiter(s) (60 mL/Hr) IV Continuous <Continuous>  heparin  Injectable 5000 Unit(s) SubCutaneous every 8 hours  influenza   Vaccine 0.5 milliLiter(s) IntraMuscular once  insulin regular  human corrective regimen sliding scale   SubCutaneous every 6 hours  meropenem IVPB 500 milliGRAM(s) IV Intermittent every 12 hours      Allergies    No Known Allergies    Intolerances        FAMILY HISTORY:  No pertinent family history in first degree relatives    Non-contributary for premature coronary disease or sudden cardiac death    SOCIAL HISTORY:    [ ] Non-smoker  [ ] Smoker  [ ] Alcohol      REVIEW OF SYSTEMS:  [ ]chest pain  [  ]shortness of breath  [  ]palpitations  [  ]syncope  [ ]near syncope [ ]upper extremity weakness   [ ] lower extremity weakness  [  ]diplopia  [  ]altered mental status   [  ]fevers  [ ]chills [ ]nausea  [ ]vomitting  [  ]dysphagia    [ ]abdominal pain  [ ]melena  [ ]BRBPR    [  ]epistaxis  [  ]rash    [ ]lower extremity edema        [ ] All others negative	  [ X] Unable to obtain    PHYSICAL EXAM:  T(C): 36.6 (10-31-17 @ 05:35), Max: 36.8 (10-30-17 @ 15:13)  HR: 71 (10-31-17 @ 06:46) (71 - 97)  BP: 153/76 (10-31-17 @ 06:46) (115/89 - 170/88)  RR: 18 (10-31-17 @ 05:35) (18 - 18)  SpO2: 97% (10-31-17 @ 05:35) (95% - 97%)  Wt(kg): --  I&O's Summary        HEENT:   Normal oral mucosa, 	  Lymphatic: No obvious lymphadenopathy , no edema  Cardiovascular: Normal S1 S2, No JVD,  1/6 SUKHDEEP murmur , Peripheral pulses palpable 2+ bilaterally  Respiratory: Lungs clear to auscultation, normal effort 	  Gastrointestinal:  Soft, Non-tender, + BS	  Skin: No rashes, No cyanosis, warm to touch  Musculoskeletal:  Unable to assess  Psychiatry:  Unable to assess Mood & affect     ECG:  	Afib 85 BPM, IVCD  RADIOLOGY:     CXR:   Cardiomegaly and bilateral pleural effusions with mild central pulmonary   venous congestion.          DIAGNOSTIC TESTING:  [ ] Echocardiogram:  [ ]  Catheterization:  [ ] Stress Test:    	  	  LABS:	 	    CARDIAC MARKERS:  CARDIAC MARKERS ( 29 Oct 2017 16:37 )  0.117 ng/mL / x     / 46 U/L / x     / 2.6 ng/mL  CARDIAC MARKERS ( 29 Oct 2017 09:50 )  0.129 ng/mL / x     / 42 U/L / x     / 2.7 ng/mL  CARDIAC MARKERS ( 29 Oct 2017 02:40 )  0.142 ng/mL / x     / 48 U/L / x     / 2.8 ng/mL                              11.4   6.6   )-----------( 133      ( 31 Oct 2017 07:35 )             36.1     Hb Trend: 11.4<--    10-31    149<H>  |  115<H>  |  29<H>  ----------------------------<  99  3.7   |  27  |  1.54<H>    Ca    8.8      31 Oct 2017 07:35  Phos  2.8     10-31  Mg     2.6     10-31    TPro  6.2  /  Alb  2.6<L>  /  TBili  0.5  /  DBili  x   /  AST  18  /  ALT  23  /  AlkPhos  76  10-31    Creatinine Trend: 1.54<--, 1.85<--, 1.78<--, 2.00<--      ASSESSMENT/PLAN: 	89y Male minimally verbal, with PMH of CAD s/p triple bypass surgery. CHF on diuretics, afib not on AC bradycardia refused PPM in the past admitted with PNA found with an EF of 10-15%    - Obtain out pt records  - not a candidate for aggressive cardiac w/u  - Not taking PO meds  - Palliative eval    I once again thank you for allowing me to participate in the care of your patient.  If you have any questions or concerns please do not hesitate to contact me.    Mani Reddy MD, [facility], [facility]  [address] [place] [address]  PHONE:  [phone]  BEEPER : [phone]

## 2017-10-31 NOTE — PROGRESS NOTE ADULT - SUBJECTIVE AND OBJECTIVE BOX
Patient is a 89y old  Male who presents with a chief complaint of lethargy (29 Oct 2017 02:22)      INTERVAL HPI/OVERNIGHT EVENTS:no events overnight     MEDICATIONS  (STANDING):  acetylcysteine 10% Inhalation 4 milliLiter(s) Inhalation every 6 hours  ALBUTerol/ipratropium for Nebulization 3 milliLiter(s) Nebulizer every 6 hours  aspirin Suppository 300 milliGRAM(s) Rectal daily  dextrose 5% + sodium chloride 0.45%. 1000 milliLiter(s) (60 mL/Hr) IV Continuous <Continuous>  heparin  Injectable 5000 Unit(s) SubCutaneous every 8 hours  influenza   Vaccine 0.5 milliLiter(s) IntraMuscular once  insulin regular  human corrective regimen sliding scale   SubCutaneous every 6 hours  meropenem IVPB 500 milliGRAM(s) IV Intermittent every 12 hours    MEDICATIONS  (PRN):      Allergies    No Known Allergies    Intolerances        REVIEW OF SYSTEMS:  unable to ilicit  as patient is non verbal    Vital Signs Last 24 Hrs  T(C): 36.6 (31 Oct 2017 05:35), Max: 36.8 (30 Oct 2017 15:13)  T(F): 97.8 (31 Oct 2017 05:35), Max: 98.3 (30 Oct 2017 15:13)  HR: 71 (31 Oct 2017 06:46) (71 - 97)  BP: 153/76 (31 Oct 2017 06:46) (115/89 - 170/88)  BP(mean): --  RR: 18 (31 Oct 2017 05:35) (18 - 18)  SpO2: 97% (31 Oct 2017 05:35) (95% - 97%)    PHYSICAL EXAM:  GENERAL: NAD,   HEAD:  Atraumatic, Normocephalic  EYES: EOMI, PERRLA, conjunctiva and sclera clear  NECK: Supple, No JVD, Normal thyroid  CHEST/LUNG: b/l rochi  HEART: Regular rate and rhythm; No murmurs, rubs, or gallops  ABDOMEN: Soft, Nontender, Nondistended; Bowel sounds present  NERVOUS SYSTEM: alert not oriented   EXTREMITIES:  1+ Peripheral Pulses,   SKIN;    LABS:                        11.4   6.6   )-----------( 133      ( 31 Oct 2017 07:35 )             36.1     10-31    149<H>  |  115<H>  |  29<H>  ----------------------------<  99  3.7   |  27  |  1.54<H>    Ca    8.8      31 Oct 2017 07:35  Phos  2.8     10-31  Mg     2.6     10-31    TPro  6.2  /  Alb  2.6<L>  /  TBili  0.5  /  DBili  x   /  AST  18  /  ALT  23  /  AlkPhos  76  10-31        CAPILLARY BLOOD GLUCOSE  76 (31 Oct 2017 06:00)  85 (31 Oct 2017 00:39)      POCT Blood Glucose.: 78 mg/dL (31 Oct 2017 12:02)  POCT Blood Glucose.: 91 mg/dL (31 Oct 2017 09:09)  POCT Blood Glucose.: 76 mg/dL (31 Oct 2017 05:59)  POCT Blood Glucose.: 85 mg/dL (31 Oct 2017 00:38)  POCT Blood Glucose.: 96 mg/dL (30 Oct 2017 21:17)      RADIOLOGY & ADDITIONAL TESTS:    Imaging Personally Reviewed:  [ ] YES  [ ] NO    Consultant(s) Notes Reviewed:  [ ] YES  [ ] NO Patient is a 89y old  Male who presents with a chief complaint of lethargy (29 Oct 2017 02:22)      INTERVAL HPI/OVERNIGHT EVENTS:no events overnight     MEDICATIONS  (STANDING):  acetylcysteine 10% Inhalation 4 milliLiter(s) Inhalation every 6 hours  ALBUTerol/ipratropium for Nebulization 3 milliLiter(s) Nebulizer every 6 hours  aspirin Suppository 300 milliGRAM(s) Rectal daily  dextrose 5% + sodium chloride 0.45%. 1000 milliLiter(s) (60 mL/Hr) IV Continuous <Continuous>  heparin  Injectable 5000 Unit(s) SubCutaneous every 8 hours  influenza   Vaccine 0.5 milliLiter(s) IntraMuscular once  insulin regular  human corrective regimen sliding scale   SubCutaneous every 6 hours  meropenem IVPB 500 milliGRAM(s) IV Intermittent every 12 hours        Allergies:    No Known Allergies          REVIEW OF SYSTEMS:  unable to ilicit  as patient is non verbal    Vital Signs Last 24 Hrs  T(C): 36.6 (31 Oct 2017 05:35), Max: 36.8 (30 Oct 2017 15:13)  T(F): 97.8 (31 Oct 2017 05:35), Max: 98.3 (30 Oct 2017 15:13)  HR: 71 (31 Oct 2017 06:46) (71 - 97)  BP: 153/76 (31 Oct 2017 06:46) (115/89 - 170/88)  BP(mean): --  RR: 18 (31 Oct 2017 05:35) (18 - 18)  SpO2: 97% (31 Oct 2017 05:35) (95% - 97%)    PHYSICAL EXAM:  GENERAL: NAD,   HEAD:  Atraumatic, Normocephalic  EYES: EOMI, PERRL, conjunctiva and sclera clear  NECK: Supple, No JVD, Normal thyroid  CHEST/LUNG: b/l rochi  HEART: Regular rate and rhythm; No murmurs, rubs, or gallops  ABDOMEN: Soft, Nontender, Nondistended; Bowel sounds present  NERVOUS SYSTEM: alert not oriented   EXTREMITIES:  1+ Peripheral Pulses,   SKIN;    LABS:                        11.4   6.6   )-----------( 133      ( 31 Oct 2017 07:35 )             36.1     10-31    149<H>  |  115<H>  |  29<H>  ----------------------------<  99  3.7   |  27  |  1.54<H>    Ca    8.8      31 Oct 2017 07:35  Phos  2.8     10-31  Mg     2.6     10-31    TPro  6.2  /  Alb  2.6<L>  /  TBili  0.5  /  DBili  x   /  AST  18  /  ALT  23  /  AlkPhos  76  10-31        CAPILLARY BLOOD GLUCOSE  76 (31 Oct 2017 06:00)  85 (31 Oct 2017 00:39)      POCT Blood Glucose.: 78 mg/dL (31 Oct 2017 12:02)  POCT Blood Glucose.: 91 mg/dL (31 Oct 2017 09:09)  POCT Blood Glucose.: 76 mg/dL (31 Oct 2017 05:59)  POCT Blood Glucose.: 85 mg/dL (31 Oct 2017 00:38)  POCT Blood Glucose.: 96 mg/dL (30 Oct 2017 21:17)      RADIOLOGY & ADDITIONAL TESTS:    Imaging Personally Reviewed:  [x ] YES  [ ] NO    Consultant(s) Notes Reviewed:  [ x] YES  [ ] NO

## 2017-10-31 NOTE — PROGRESS NOTE ADULT - SUBJECTIVE AND OBJECTIVE BOX
89y Male who is still lethargic but arousable, his EF is 10-15 % only, he sounds congested still, gets agitated with noxious stimuli. No fevers.    Meds:  meropenem IVPB 500 milliGRAM(s) IV Intermittent every 12 hours    Allergies    No Known Allergies    Intolerances        VITALS:  Vital Signs Last 24 Hrs  T(C): 36.6 (31 Oct 2017 05:35), Max: 36.8 (30 Oct 2017 15:13)  T(F): 97.8 (31 Oct 2017 05:35), Max: 98.3 (30 Oct 2017 15:13)  HR: 71 (31 Oct 2017 06:46) (71 - 97)  BP: 153/76 (31 Oct 2017 06:46) (115/89 - 170/88)  BP(mean): --  RR: 18 (31 Oct 2017 05:35) (18 - 18)  SpO2: 97% (31 Oct 2017 05:35) (95% - 97%)    LABS/DIAGNOSTIC TESTS:                          11.4   6.6   )-----------( 133      ( 31 Oct 2017 07:35 )             36.1         10-31    149<H>  |  115<H>  |  29<H>  ----------------------------<  99  3.7   |  27  |  1.54<H>    Ca    8.8      31 Oct 2017 07:35  Phos  2.8     10-31  Mg     2.6     10-31    TPro  6.2  /  Alb  2.6<L>  /  TBili  0.5  /  DBili  x   /  AST  18  /  ALT  23  /  AlkPhos  76  10-31      LIVER FUNCTIONS - ( 31 Oct 2017 07:35 )  Alb: 2.6 g/dL / Pro: 6.2 g/dL / ALK PHOS: 76 U/L / ALT: 23 U/L DA / AST: 18 U/L / GGT: x             CULTURES: .Urine Catheterized  10-29 @ 12:23   No growth  --  --      .Blood Blood-Peripheral  10-29 @ 12:16   Growth in aerobic bottle: Gram Positive Cocci in Clusters  ***Blood Panel PCR results on this specimen are available  approximately 3 hours after the Gram stain result.***  Gram stain, PCR, and/or culture results may not always  correspond due to difference in methodologies.  ************************************************************  This PCR assay was performed using [employer].  The following targets are tested for: Enterococcus,  vancomycin resistant enterococci, Listeria monocytogenes,  coagulase negative staphylococci, S. aureus,  methicillin resistant S. aureus, Streptococcus agalactiae  (Group B), S. pneumoniae, S. pyogenes (Group A),  Acinetobacter baumannii, Enterobacter cloacae, E. coli,  Klebsiella oxytoca, K. pneumoniae, Proteus sp.,  Serratia marcescens, Haemophilus influenzae,  Neisseria meningitidis, Pseudomonas aeruginosa, Candida  albicans, C. glabrata, C krusei, C parapsilosis,  C. tropicalis and the KPC resistance gene.  --  Blood Culture PCR            RADIOLOGY:      ROS:  [ x ] UNABLE TO ELICIT

## 2017-10-31 NOTE — PROGRESS NOTE ADULT - ASSESSMENT
Patient is a 89 year old  Male who presents with a chief complaint of lethargy, is admitted to the medicine floor for PNA likely from community acquired vs aspiration  and possible underlying chf.

## 2017-10-31 NOTE — PROGRESS NOTE ADULT - ATTENDING COMMENTS
Patient seen and examined. Patient's history, vitals, labs, imaging studies reviewed. Discussed with above resident, agree with note with edits.   Dinorah Felix MD  10/31/2017

## 2017-10-31 NOTE — PROGRESS NOTE ADULT - PROBLEM SELECTOR PLAN 2
sodium of 149  patient not eating  c/w d5 half ns@ 50 cc/hr b/l eryn  cxr shows possible infiltrate vs pulmonary edema  echo ef of 15 percent  Cardiology consulted

## 2017-10-31 NOTE — PROGRESS NOTE ADULT - ASSESSMENT
possible  pneumonia - CHF still possible  plan - cont meropenem 500mgs iv q 12hrs for now  will repeat cxr in 2 days.

## 2017-10-31 NOTE — PROGRESS NOTE ADULT - PROBLEM SELECTOR PLAN 1
b/l eryn  cxr shows possible infiltrate vs pulmonary edema  WITH STAPH BACTEREMIA  f/u repeat cultures  c/w meropenem day no 2  echo ef of 15 percent WITH STAPH BACTEREMIA  f/u repeat cultures  c/w meropenem day no 2 per ID

## 2017-11-01 DIAGNOSIS — Z71.89 OTHER SPECIFIED COUNSELING: ICD-10-CM

## 2017-11-01 LAB
ALBUMIN SERPL ELPH-MCNC: 2.8 G/DL — LOW (ref 3.5–5)
ALP SERPL-CCNC: 84 U/L — SIGNIFICANT CHANGE UP (ref 40–120)
ALT FLD-CCNC: 23 U/L DA — SIGNIFICANT CHANGE UP (ref 10–60)
ANION GAP SERPL CALC-SCNC: 8 MMOL/L — SIGNIFICANT CHANGE UP (ref 5–17)
AST SERPL-CCNC: 18 U/L — SIGNIFICANT CHANGE UP (ref 10–40)
BASOPHILS # BLD AUTO: 0.1 K/UL — SIGNIFICANT CHANGE UP (ref 0–0.2)
BASOPHILS NFR BLD AUTO: 0.9 % — SIGNIFICANT CHANGE UP (ref 0–2)
BILIRUB SERPL-MCNC: 0.6 MG/DL — SIGNIFICANT CHANGE UP (ref 0.2–1.2)
BUN SERPL-MCNC: 32 MG/DL — HIGH (ref 7–18)
CALCIUM SERPL-MCNC: 9.3 MG/DL — SIGNIFICANT CHANGE UP (ref 8.4–10.5)
CHLORIDE SERPL-SCNC: 116 MMOL/L — HIGH (ref 96–108)
CO2 SERPL-SCNC: 26 MMOL/L — SIGNIFICANT CHANGE UP (ref 22–31)
CREAT SERPL-MCNC: 1.81 MG/DL — HIGH (ref 0.5–1.3)
EOSINOPHIL # BLD AUTO: 0 K/UL — SIGNIFICANT CHANGE UP (ref 0–0.5)
EOSINOPHIL NFR BLD AUTO: 0.3 % — SIGNIFICANT CHANGE UP (ref 0–6)
GLUCOSE SERPL-MCNC: 118 MG/DL — HIGH (ref 70–99)
HCT VFR BLD CALC: 38.3 % — LOW (ref 39–50)
HGB BLD-MCNC: 12.1 G/DL — LOW (ref 13–17)
LYMPHOCYTES # BLD AUTO: 0.5 K/UL — LOW (ref 1–3.3)
LYMPHOCYTES # BLD AUTO: 6.1 % — LOW (ref 13–44)
MAGNESIUM SERPL-MCNC: 2.9 MG/DL — HIGH (ref 1.6–2.6)
MCHC RBC-ENTMCNC: 31.6 GM/DL — LOW (ref 32–36)
MCHC RBC-ENTMCNC: 32.4 PG — SIGNIFICANT CHANGE UP (ref 27–34)
MCV RBC AUTO: 102.4 FL — HIGH (ref 80–100)
MONOCYTES # BLD AUTO: 0.7 K/UL — SIGNIFICANT CHANGE UP (ref 0–0.9)
MONOCYTES NFR BLD AUTO: 8.7 % — SIGNIFICANT CHANGE UP (ref 2–14)
NEUTROPHILS # BLD AUTO: 6.9 K/UL — SIGNIFICANT CHANGE UP (ref 1.8–7.4)
NEUTROPHILS NFR BLD AUTO: 84 % — HIGH (ref 43–77)
PHOSPHATE SERPL-MCNC: 3 MG/DL — SIGNIFICANT CHANGE UP (ref 2.5–4.5)
PLATELET # BLD AUTO: 139 K/UL — LOW (ref 150–400)
POTASSIUM SERPL-MCNC: 3.8 MMOL/L — SIGNIFICANT CHANGE UP (ref 3.5–5.3)
POTASSIUM SERPL-SCNC: 3.8 MMOL/L — SIGNIFICANT CHANGE UP (ref 3.5–5.3)
PROT SERPL-MCNC: 6.5 G/DL — SIGNIFICANT CHANGE UP (ref 6–8.3)
RBC # BLD: 3.74 M/UL — LOW (ref 4.2–5.8)
RBC # FLD: 16 % — HIGH (ref 10.3–14.5)
SODIUM SERPL-SCNC: 150 MMOL/L — HIGH (ref 135–145)
WBC # BLD: 8.2 K/UL — SIGNIFICANT CHANGE UP (ref 3.8–10.5)
WBC # FLD AUTO: 8.2 K/UL — SIGNIFICANT CHANGE UP (ref 3.8–10.5)

## 2017-11-01 RX ORDER — SODIUM CHLORIDE 9 MG/ML
1000 INJECTION, SOLUTION INTRAVENOUS
Qty: 0 | Refills: 0 | Status: DISCONTINUED | OUTPATIENT
Start: 2017-11-01 | End: 2017-11-01

## 2017-11-01 RX ORDER — SODIUM CHLORIDE 9 MG/ML
1000 INJECTION, SOLUTION INTRAVENOUS
Qty: 0 | Refills: 0 | Status: DISCONTINUED | OUTPATIENT
Start: 2017-11-01 | End: 2017-11-02

## 2017-11-01 RX ADMIN — SODIUM CHLORIDE 50 MILLILITER(S): 9 INJECTION, SOLUTION INTRAVENOUS at 22:24

## 2017-11-01 RX ADMIN — MEROPENEM 200 MILLIGRAM(S): 1 INJECTION INTRAVENOUS at 18:31

## 2017-11-01 RX ADMIN — HEPARIN SODIUM 5000 UNIT(S): 5000 INJECTION INTRAVENOUS; SUBCUTANEOUS at 22:25

## 2017-11-01 RX ADMIN — INSULIN HUMAN 1: 100 INJECTION, SOLUTION SUBCUTANEOUS at 00:37

## 2017-11-01 RX ADMIN — Medication 3 MILLILITER(S): at 14:15

## 2017-11-01 RX ADMIN — Medication 3 MILLILITER(S): at 20:14

## 2017-11-01 RX ADMIN — INSULIN HUMAN 1: 100 INJECTION, SOLUTION SUBCUTANEOUS at 05:24

## 2017-11-01 RX ADMIN — HEPARIN SODIUM 5000 UNIT(S): 5000 INJECTION INTRAVENOUS; SUBCUTANEOUS at 13:15

## 2017-11-01 RX ADMIN — MEROPENEM 200 MILLIGRAM(S): 1 INJECTION INTRAVENOUS at 05:11

## 2017-11-01 RX ADMIN — Medication 4 MILLILITER(S): at 20:14

## 2017-11-01 RX ADMIN — HEPARIN SODIUM 5000 UNIT(S): 5000 INJECTION INTRAVENOUS; SUBCUTANEOUS at 05:18

## 2017-11-01 RX ADMIN — Medication 300 MILLIGRAM(S): at 13:01

## 2017-11-01 RX ADMIN — Medication 3 MILLILITER(S): at 09:20

## 2017-11-01 RX ADMIN — SODIUM CHLORIDE 70 MILLILITER(S): 9 INJECTION, SOLUTION INTRAVENOUS at 12:58

## 2017-11-01 RX ADMIN — Medication 4 MILLILITER(S): at 14:15

## 2017-11-01 NOTE — CHART NOTE - NSCHARTNOTEFT_GEN_A_CORE
Spoke with HCP Alejandra Henry about Mr. Adair's status and heart failure (EF 10-15%).  She was going to bring his living will to review with me, but she has not been able to come back and her and her son are having difficulty making decisions about his code status.  Given his age and end stage heart failure, a DNR would be appropriate.  Her son Ignacio will be calling me back soon to further discuss and he will also give me his outpatient cardiologist information.

## 2017-11-01 NOTE — PROGRESS NOTE ADULT - SUBJECTIVE AND OBJECTIVE BOX
Patient is a 89y old  Male who presents with a chief complaint of lethargy (29 Oct 2017 02:22)      INTERVAL HPI/OVERNIGHT EVENTS: no over night events     MEDICATIONS  (STANDING):  acetylcysteine 10% Inhalation 4 milliLiter(s) Inhalation every 6 hours  ALBUTerol/ipratropium for Nebulization 3 milliLiter(s) Nebulizer every 6 hours  aspirin Suppository 300 milliGRAM(s) Rectal daily  dextrose 5%. 1000 milliLiter(s) (60 mL/Hr) IV Continuous <Continuous>  heparin  Injectable 5000 Unit(s) SubCutaneous every 8 hours  influenza   Vaccine 0.5 milliLiter(s) IntraMuscular once  insulin regular  human corrective regimen sliding scale   SubCutaneous every 6 hours  meropenem IVPB 500 milliGRAM(s) IV Intermittent every 12 hours    MEDICATIONS  (PRN):      Allergies    No Known Allergies    Intolerances        REVIEW OF SYSTEMS:  unable to  ilicit because of his medical condition    Vital Signs Last 24 Hrs  T(C): 36.6 (01 Nov 2017 05:09), Max: 36.9 (31 Oct 2017 14:50)  T(F): 97.9 (01 Nov 2017 05:09), Max: 98.5 (31 Oct 2017 14:50)  HR: 93 (01 Nov 2017 10:55) (83 - 93)  BP: 162/99 (01 Nov 2017 10:55) (141/97 - 162/99)  BP(mean): --  RR: 18 (01 Nov 2017 05:09) (18 - 18)  SpO2: 93% (01 Nov 2017 10:55) (93% - 98%)    PHYSICAL EXAM:  GENERAL: NAD  HEAD:  Atraumatic, Normocephalic  EYES: EOMI, PERRLA, conjunctiva and sclera clear  NECK: Supple,JVD  CHEST/LUNG: b/l ronchi r>l  HEART: irregular rate   ABDOMEN: Soft, Nontender, Nondistended; Bowel sounds present  NERVOUS SYSTEM:  Alert not oriented does not follow commands    EXTREMITIES:  2+ Peripheral Pulses, No clubbing, cyanosis, or edema  SKIN;    LABS:                        12.1   8.2   )-----------( 139      ( 01 Nov 2017 07:12 )             38.3     11-01    150<H>  |  116<H>  |  32<H>  ----------------------------<  118<H>  3.8   |  26  |  1.81<H>    Ca    9.3      01 Nov 2017 07:12  Phos  3.0     11-01  Mg     2.9     11-01    TPro  6.5  /  Alb  2.8<L>  /  TBili  0.6  /  DBili  x   /  AST  18  /  ALT  23  /  AlkPhos  84  11-01        CAPILLARY BLOOD GLUCOSE  165 (01 Nov 2017 00:31)      POCT Blood Glucose.: 98 mg/dL (01 Nov 2017 11:50)  POCT Blood Glucose.: 174 mg/dL (01 Nov 2017 05:20)  POCT Blood Glucose.: 165 mg/dL (01 Nov 2017 00:28)  POCT Blood Glucose.: 172 mg/dL (31 Oct 2017 16:50)      RADIOLOGY & ADDITIONAL TESTS:    Imaging Personally Reviewed:  [ ] YES  [ ] NO    Consultant(s) Notes Reviewed:  [ ] YES  [ ] NO Patient is a 89y old  Male who presents with a chief complaint of lethargy (29 Oct 2017 02:22)      INTERVAL HPI/OVERNIGHT EVENTS: no over night events     MEDICATIONS  (STANDING):  acetylcysteine 10% Inhalation 4 milliLiter(s) Inhalation every 6 hours  ALBUTerol/ipratropium for Nebulization 3 milliLiter(s) Nebulizer every 6 hours  aspirin Suppository 300 milliGRAM(s) Rectal daily  dextrose 5%. 1000 milliLiter(s) (60 mL/Hr) IV Continuous <Continuous>  heparin  Injectable 5000 Unit(s) SubCutaneous every 8 hours  influenza   Vaccine 0.5 milliLiter(s) IntraMuscular once  insulin regular  human corrective regimen sliding scale   SubCutaneous every 6 hours  meropenem IVPB 500 milliGRAM(s) IV Intermittent every 12 hours    MEDICATIONS  (PRN):      Allergies    No Known Allergies      REVIEW OF SYSTEMS:  unable to  ilicit because of his medical condition    Vital Signs Last 24 Hrs  T(C): 36.6 (01 Nov 2017 05:09), Max: 36.9 (31 Oct 2017 14:50)  T(F): 97.9 (01 Nov 2017 05:09), Max: 98.5 (31 Oct 2017 14:50)  HR: 93 (01 Nov 2017 10:55) (83 - 93)  BP: 162/99 (01 Nov 2017 10:55) (141/97 - 162/99)  RR: 18 (01 Nov 2017 05:09) (18 - 18)  SpO2: 93% (01 Nov 2017 10:55) (93% - 98%)    PHYSICAL EXAM:  GENERAL: NAD  HEAD:  Atraumatic, Normocephalic  EYES: EOMI, PERRL, conjunctiva and sclera clear  NECK: Supple,JVD  CHEST/LUNG: b/l ronchi r>l  HEART: irregular rate   ABDOMEN: Soft, Nontender, Nondistended; Bowel sounds present  NERVOUS SYSTEM:  Alert not oriented does not follow commands    EXTREMITIES:  2+ Peripheral Pulses, No clubbing, cyanosis, or edema  SKIN;    LABS:                        12.1   8.2   )-----------( 139      ( 01 Nov 2017 07:12 )             38.3     11-01    150<H>  |  116<H>  |  32<H>  ----------------------------<  118<H>  3.8   |  26  |  1.81<H>    Ca    9.3      01 Nov 2017 07:12  Phos  3.0     11-01  Mg     2.9     11-01    TPro  6.5  /  Alb  2.8<L>  /  TBili  0.6  /  DBili  x   /  AST  18  /  ALT  23  /  AlkPhos  84  11-01        CAPILLARY BLOOD GLUCOSE  165 (01 Nov 2017 00:31)      POCT Blood Glucose.: 98 mg/dL (01 Nov 2017 11:50)  POCT Blood Glucose.: 174 mg/dL (01 Nov 2017 05:20)  POCT Blood Glucose.: 165 mg/dL (01 Nov 2017 00:28)  POCT Blood Glucose.: 172 mg/dL (31 Oct 2017 16:50)      RADIOLOGY & ADDITIONAL TESTS:    Imaging Personally Reviewed:  [x ] YES  [ ] NO    Consultant(s) Notes Reviewed:  [x ] YES  [ ] NO

## 2017-11-01 NOTE — PROGRESS NOTE ADULT - ATTENDING COMMENTS
Agree with above assessment and plan as outlined above.    - conservative management  - Palliative f/u    Mani Reddy MD, FACC  [facility], [facility]  [address] [place] [address]  PHONE:  [phone]  BEEPER : [phone]

## 2017-11-01 NOTE — PROGRESS NOTE ADULT - PROBLEM SELECTOR PLAN 3
sodium of 150  patient not eating  c/w d5 60cc/hr  not giving per free water deficit as he has EF of 15 percent

## 2017-11-01 NOTE — PROGRESS NOTE ADULT - PROBLEM SELECTOR PLAN 1
WITH STAPH BACTEREMIA  f/u repeat cultures  c/w meropenem day no 3 per ID  repeat cx are negative   f/u repeat cx nishant

## 2017-11-01 NOTE — CHART NOTE - NSCHARTNOTEFT_GEN_A_CORE
Code Blue Note:    Pt found to be unresponsive, with no respiratory effort and pulse was absent. Code blue was called immediately at 1649 PM and CPR was initiated. Pt was last seen normal by PCA/RN 10-15 minutes prior. Code was conducted for approximately 12 minutes and pt was given 4 epinephrines, 1 amp sodium bicarbonate. Pt never achieved ROSC, only pulseless VT after second round of epinephrine and pt was given 200 J of defibrillation at that time; otherwise, pt was asystole during pulse checks. LMA was placed initially, and then endotracheal airway was established after 2 attempts. Time of death was called at 17:02, pulseless with fixed and dilated pupils.

## 2017-11-01 NOTE — PROGRESS NOTE ADULT - PROBLEM SELECTOR PLAN 2
b/l eryn  cxr shows possible infiltrate vs pulmonary edema  echo ef of 15 percent  Cardiology consulted no intervention at this time b/l ronchi  PNA likely from community acquired vs aspiration  and possible underlying acute on chronic systolic chf.  cxr shows possible bilateral lower lobe infiltrate vs pulmonary edema  echo ef of 15 percent  Cardiology consulted no intervention at this time

## 2017-11-01 NOTE — PROGRESS NOTE ADULT - ASSESSMENT
Patient is a 89 year old  Male who presents with a chief complaint of lethargy, is admitted to the medicine floor for PNA likely from community acquired vs aspiration  and possible underlying chf. Patient is a 89 year old  Male who presents with a chief complaint of lethargy, is admitted to the medicine floor for PNA likely from community acquired  and possible underlying acute on chronic systolic chf.

## 2017-11-01 NOTE — PROGRESS NOTE ADULT - ATTENDING COMMENTS
Patient seen and examined. Patient's history, vitals, labs, imaging studies reviewed. Discussed with above resident, agree with note with edits. Nephrology Dr. Bennett consulted.   Dinorah Felix MD  11/1/2017

## 2017-11-01 NOTE — PROGRESS NOTE ADULT - SUBJECTIVE AND OBJECTIVE BOX
1Subjective:  pt seen and examined,  resting comfortably , NAD on exam     acetylcysteine 10% Inhalation 4 milliLiter(s) Inhalation every 6 hours  ALBUTerol/ipratropium for Nebulization 3 milliLiter(s) Nebulizer every 6 hours  aspirin Suppository 300 milliGRAM(s) Rectal daily  dextrose 5% + sodium chloride 0.45%. 1000 milliLiter(s) IV Continuous <Continuous>  heparin  Injectable 5000 Unit(s) SubCutaneous every 8 hours  influenza   Vaccine 0.5 milliLiter(s) IntraMuscular once  insulin regular  human corrective regimen sliding scale   SubCutaneous every 6 hours  meropenem IVPB 500 milliGRAM(s) IV Intermittent every 12 hours                            11.4   6.6   )-----------( 133      ( 31 Oct 2017 07:35 )             36.1       Hemoglobin: 11.4 g/dL (10-31 @ 07:35)  Hemoglobin: 12.0 g/dL (10-30 @ 07:20)  Hemoglobin: 12.5 g/dL (10-29 @ 05:51)  Hemoglobin: 13.6 g/dL (10-29 @ 01:14)      10-31    150<H>  |  116<H>  |  32<H>  ----------------------------<  139<H>  3.8   |  28  |  1.69<H>    Ca    8.9      31 Oct 2017 14:27  Phos  2.6     10-31  Mg     2.6     10-31    TPro  6.4  /  Alb  2.7<L>  /  TBili  0.5  /  DBili  x   /  AST  16  /  ALT  22  /  AlkPhos  86  10-31    Creatinine Trend: 1.69<--, 1.54<--, 1.85<--, 1.78<--, 2.00<--    COAGS:     CARDIAC MARKERS ( 29 Oct 2017 16:37 )  0.117 ng/mL / x     / 46 U/L / x     / 2.6 ng/mL  CARDIAC MARKERS ( 29 Oct 2017 09:50 )  0.129 ng/mL / x     / 42 U/L / x     / 2.7 ng/mL        T(C): 36.9 (10-31-17 @ 20:33), Max: 36.9 (10-31-17 @ 14:50)  HR: 85 (10-31-17 @ 20:33) (71 - 92)  BP: 150/89 (10-31-17 @ 20:33) (149/75 - 163/96)  RR: 18 (10-31-17 @ 20:33) (18 - 18)  SpO2: 98% (10-31-17 @ 20:33) (94% - 98%)  Wt(kg): --    I&O's Summary      HEENT:   Normal oral mucosa, PERRL, EOMI	  Lymphatic: No lymphadenopathy , no edema  Cardiovascular: Normal S1 S2, No JVD, No murmurs , Peripheral pulses palpable 2+ bilaterally  Respiratory: Lungs clear to auscultation, normal effort 	  Gastrointestinal:  Soft, Non-tender, + BS	      TELEMETRY: Afib       DIAGNOSTIC TESTING:  [ ] Echocardiogram: < from: Transthoracic Echocardiogram (10.30.17 @ 07:20) >  CONCLUSIONS:  1. Normal mitral valve. Mild mitral regurgitation.  2. Calcified trileaflet aortic valve with decreased  opening. Adequate velocities were not obtained to estimate  severity of Aortic stenosis. Mild aortic insufficiency.  3. Aortic Root: 3.8 cm.  4. Severe left atrial enlargement.  5. Mild concentric left ventricular hypertrophy.  6. Dilated LV with severe global left ventricular  dysfunction.  7. Grade II diastolic dysfunction.  8. Severe right atrial enlargement.  9. Right ventricular enlargement with normal RV function.  10. RA Pressure is 15 mm Hg.  11. RV systolic pressure is severely increased at  76 mm  Hg.  12. There is moderate tricuspid regurgitation.  13. There is mild pulmonic regurgitation.  14. Normal pericardium with no pericardial effusion.    < end of copied text >    [ ]  Catheterization:  [ ] Stress Test:    OTHER: 	        ASSESSMENT/PLAN: 	89y Male minimally verbal, with PMH of CAD s/p triple bypass surgery. CHF on diuretics, afib not on AC bradycardia refused PPM in the past admitted with PNA found with an EF of 10-15%    Aspiration precaution   cont asa ,   ABx per medicine   IV hydration ,   conservative management for CM,   palliative consult   NO A/c for afib , asa only    D/W Dr Reddy Pt resting , doesnt answer questions    acetylcysteine 10% Inhalation 4 milliLiter(s) Inhalation every 6 hours  ALBUTerol/ipratropium for Nebulization 3 milliLiter(s) Nebulizer every 6 hours  aspirin Suppository 300 milliGRAM(s) Rectal daily  dextrose 5% + sodium chloride 0.45%. 1000 milliLiter(s) IV Continuous <Continuous>  heparin  Injectable 5000 Unit(s) SubCutaneous every 8 hours  influenza   Vaccine 0.5 milliLiter(s) IntraMuscular once  insulin regular  human corrective regimen sliding scale   SubCutaneous every 6 hours  meropenem IVPB 500 milliGRAM(s) IV Intermittent every 12 hours                            11.4   6.6   )-----------( 133      ( 31 Oct 2017 07:35 )             36.1       Hemoglobin: 11.4 g/dL (10-31 @ 07:35)  Hemoglobin: 12.0 g/dL (10-30 @ 07:20)  Hemoglobin: 12.5 g/dL (10-29 @ 05:51)  Hemoglobin: 13.6 g/dL (10-29 @ 01:14)      10-31    150<H>  |  116<H>  |  32<H>  ----------------------------<  139<H>  3.8   |  28  |  1.69<H>    Ca    8.9      31 Oct 2017 14:27  Phos  2.6     10-31  Mg     2.6     10-31    TPro  6.4  /  Alb  2.7<L>  /  TBili  0.5  /  DBili  x   /  AST  16  /  ALT  22  /  AlkPhos  86  10-31    Creatinine Trend: 1.69<--, 1.54<--, 1.85<--, 1.78<--, 2.00<--    COAGS:     CARDIAC MARKERS ( 29 Oct 2017 16:37 )  0.117 ng/mL / x     / 46 U/L / x     / 2.6 ng/mL  CARDIAC MARKERS ( 29 Oct 2017 09:50 )  0.129 ng/mL / x     / 42 U/L / x     / 2.7 ng/mL        T(C): 36.9 (10-31-17 @ 20:33), Max: 36.9 (10-31-17 @ 14:50)  HR: 85 (10-31-17 @ 20:33) (71 - 92)  BP: 150/89 (10-31-17 @ 20:33) (149/75 - 163/96)  RR: 18 (10-31-17 @ 20:33) (18 - 18)  SpO2: 98% (10-31-17 @ 20:33) (94% - 98%)  Wt(kg): --    I&O's Summary      HEENT:   Normal oral mucosa, PERRL, EOMI	  Lymphatic: No lymphadenopathy , no edema  Cardiovascular: Normal S1 S2, No JVD, No murmurs , Peripheral pulses palpable 2+ bilaterally  Respiratory: Lungs clear to auscultation, normal effort 	  Gastrointestinal:  Soft, Non-tender, + BS	      TELEMETRY: Afib       DIAGNOSTIC TESTING:  [ ] Echocardiogram: < from: Transthoracic Echocardiogram (10.30.17 @ 07:20) >  CONCLUSIONS:  1. Normal mitral valve. Mild mitral regurgitation.  2. Calcified trileaflet aortic valve with decreased  opening. Adequate velocities were not obtained to estimate  severity of Aortic stenosis. Mild aortic insufficiency.  3. Aortic Root: 3.8 cm.  4. Severe left atrial enlargement.  5. Mild concentric left ventricular hypertrophy.  6. Dilated LV with severe global left ventricular  dysfunction.  7. Grade II diastolic dysfunction.  8. Severe right atrial enlargement.  9. Right ventricular enlargement with normal RV function.  10. RA Pressure is 15 mm Hg.  11. RV systolic pressure is severely increased at  76 mm  Hg.  12. There is moderate tricuspid regurgitation.  13. There is mild pulmonic regurgitation.  14. Normal pericardium with no pericardial effusion.    < end of copied text >    [ ]  Catheterization:  [ ] Stress Test:    OTHER: 	        ASSESSMENT/PLAN: 	89y Male minimally verbal, with PMH of CAD s/p triple bypass surgery. CHF on diuretics, afib not on AC bradycardia refused PPM in the past admitted with PNA found with an EF of 10-15%    Aspiration precaution   cont asa ,   ABx per medicine   IV hydration ,   conservative management for CM,   palliative consult     D/W Dr Reddy

## 2017-11-02 LAB
ALBUMIN SERPL ELPH-MCNC: 2.6 G/DL — LOW (ref 3.5–5)
ALP SERPL-CCNC: 75 U/L — SIGNIFICANT CHANGE UP (ref 40–120)
ALT FLD-CCNC: 20 U/L DA — SIGNIFICANT CHANGE UP (ref 10–60)
ANION GAP SERPL CALC-SCNC: 6 MMOL/L — SIGNIFICANT CHANGE UP (ref 5–17)
AST SERPL-CCNC: 14 U/L — SIGNIFICANT CHANGE UP (ref 10–40)
BASOPHILS # BLD AUTO: 0.1 K/UL — SIGNIFICANT CHANGE UP (ref 0–0.2)
BASOPHILS NFR BLD AUTO: 0.7 % — SIGNIFICANT CHANGE UP (ref 0–2)
BILIRUB SERPL-MCNC: 0.5 MG/DL — SIGNIFICANT CHANGE UP (ref 0.2–1.2)
BUN SERPL-MCNC: 31 MG/DL — HIGH (ref 7–18)
CALCIUM SERPL-MCNC: 9 MG/DL — SIGNIFICANT CHANGE UP (ref 8.4–10.5)
CHLORIDE SERPL-SCNC: 114 MMOL/L — HIGH (ref 96–108)
CO2 SERPL-SCNC: 30 MMOL/L — SIGNIFICANT CHANGE UP (ref 22–31)
CREAT SERPL-MCNC: 1.65 MG/DL — HIGH (ref 0.5–1.3)
EOSINOPHIL # BLD AUTO: 0.1 K/UL — SIGNIFICANT CHANGE UP (ref 0–0.5)
EOSINOPHIL NFR BLD AUTO: 1.3 % — SIGNIFICANT CHANGE UP (ref 0–6)
GLUCOSE SERPL-MCNC: 126 MG/DL — HIGH (ref 70–99)
HCT VFR BLD CALC: 39.4 % — SIGNIFICANT CHANGE UP (ref 39–50)
HGB BLD-MCNC: 12.1 G/DL — LOW (ref 13–17)
LYMPHOCYTES # BLD AUTO: 0.6 K/UL — LOW (ref 1–3.3)
LYMPHOCYTES # BLD AUTO: 8.1 % — LOW (ref 13–44)
MAGNESIUM SERPL-MCNC: 2.8 MG/DL — HIGH (ref 1.6–2.6)
MCHC RBC-ENTMCNC: 30.8 GM/DL — LOW (ref 32–36)
MCHC RBC-ENTMCNC: 32 PG — SIGNIFICANT CHANGE UP (ref 27–34)
MCV RBC AUTO: 104 FL — HIGH (ref 80–100)
MONOCYTES # BLD AUTO: 0.8 K/UL — SIGNIFICANT CHANGE UP (ref 0–0.9)
MONOCYTES NFR BLD AUTO: 10.1 % — SIGNIFICANT CHANGE UP (ref 2–14)
NEUTROPHILS # BLD AUTO: 6 K/UL — SIGNIFICANT CHANGE UP (ref 1.8–7.4)
NEUTROPHILS NFR BLD AUTO: 79.9 % — HIGH (ref 43–77)
PHOSPHATE SERPL-MCNC: 3.3 MG/DL — SIGNIFICANT CHANGE UP (ref 2.5–4.5)
PLATELET # BLD AUTO: 139 K/UL — LOW (ref 150–400)
POTASSIUM SERPL-MCNC: 4.1 MMOL/L — SIGNIFICANT CHANGE UP (ref 3.5–5.3)
POTASSIUM SERPL-SCNC: 4.1 MMOL/L — SIGNIFICANT CHANGE UP (ref 3.5–5.3)
PROT SERPL-MCNC: 6.1 G/DL — SIGNIFICANT CHANGE UP (ref 6–8.3)
RBC # BLD: 3.8 M/UL — LOW (ref 4.2–5.8)
RBC # FLD: 16.5 % — HIGH (ref 10.3–14.5)
SODIUM SERPL-SCNC: 150 MMOL/L — HIGH (ref 135–145)
WBC # BLD: 7.5 K/UL — SIGNIFICANT CHANGE UP (ref 3.8–10.5)
WBC # FLD AUTO: 7.5 K/UL — SIGNIFICANT CHANGE UP (ref 3.8–10.5)

## 2017-11-02 PROCEDURE — 71010: CPT | Mod: 26

## 2017-11-02 RX ORDER — SODIUM CHLORIDE 9 MG/ML
1000 INJECTION, SOLUTION INTRAVENOUS
Qty: 0 | Refills: 0 | Status: DISCONTINUED | OUTPATIENT
Start: 2017-11-02 | End: 2017-11-03

## 2017-11-02 RX ADMIN — HEPARIN SODIUM 5000 UNIT(S): 5000 INJECTION INTRAVENOUS; SUBCUTANEOUS at 05:02

## 2017-11-02 RX ADMIN — SODIUM CHLORIDE 50 MILLILITER(S): 9 INJECTION, SOLUTION INTRAVENOUS at 13:47

## 2017-11-02 RX ADMIN — HEPARIN SODIUM 5000 UNIT(S): 5000 INJECTION INTRAVENOUS; SUBCUTANEOUS at 13:46

## 2017-11-02 RX ADMIN — MEROPENEM 200 MILLIGRAM(S): 1 INJECTION INTRAVENOUS at 18:46

## 2017-11-02 RX ADMIN — Medication 3 MILLILITER(S): at 20:40

## 2017-11-02 RX ADMIN — Medication 4 MILLILITER(S): at 16:05

## 2017-11-02 RX ADMIN — Medication 4 MILLILITER(S): at 09:40

## 2017-11-02 RX ADMIN — HEPARIN SODIUM 5000 UNIT(S): 5000 INJECTION INTRAVENOUS; SUBCUTANEOUS at 21:49

## 2017-11-02 RX ADMIN — Medication 4 MILLILITER(S): at 20:40

## 2017-11-02 RX ADMIN — Medication 3 MILLILITER(S): at 09:39

## 2017-11-02 RX ADMIN — MEROPENEM 200 MILLIGRAM(S): 1 INJECTION INTRAVENOUS at 05:01

## 2017-11-02 RX ADMIN — Medication 3 MILLILITER(S): at 16:05

## 2017-11-02 RX ADMIN — SODIUM CHLORIDE 100 MILLILITER(S): 9 INJECTION, SOLUTION INTRAVENOUS at 21:53

## 2017-11-02 NOTE — PROGRESS NOTE ADULT - SUBJECTIVE AND OBJECTIVE BOX
Patient is a 89y old  Male who presents with a chief complaint of lethargy (29 Oct 2017 02:22)      INTERVAL HPI/OVERNIGHT EVENTS: patient was made DNR/DNI after family agreed     MEDICATIONS  (STANDING):  acetylcysteine 10% Inhalation 4 milliLiter(s) Inhalation every 6 hours  ALBUTerol/ipratropium for Nebulization 3 milliLiter(s) Nebulizer every 6 hours  aspirin Suppository 300 milliGRAM(s) Rectal daily  dextrose 5%. 1000 milliLiter(s) (50 mL/Hr) IV Continuous <Continuous>  heparin  Injectable 5000 Unit(s) SubCutaneous every 8 hours  influenza   Vaccine 0.5 milliLiter(s) IntraMuscular once  insulin regular  human corrective regimen sliding scale   SubCutaneous every 6 hours  meropenem IVPB 500 milliGRAM(s) IV Intermittent every 12 hours    MEDICATIONS  (PRN):      Allergies    No Known Allergies    Intolerances        REVIEW OF SYSTEMS:  unable to elicit     Vital Signs Last 24 Hrs  T(C): 37.2 (02 Nov 2017 05:19), Max: 37.2 (02 Nov 2017 05:19)  T(F): 98.9 (02 Nov 2017 05:19), Max: 98.9 (02 Nov 2017 05:19)  HR: 81 (02 Nov 2017 05:19) (73 - 84)  BP: 151/85 (02 Nov 2017 05:19) (143/71 - 159/81)  BP(mean): --  RR: 18 (02 Nov 2017 05:19) (16 - 18)  SpO2: 100% (02 Nov 2017 05:19) (97% - 100%)    PHYSICAL EXAM:  GENERAL: NAD, well-groomed, well-developed  HEAD:  Atraumatic, Normocephalic  EYES: EOMI, PERRLA, conjunctiva and sclera clear  NECK: Supple, No JVD, Normal thyroid  CHEST/LUNG: Clear to percussion bilaterally; No rales, rhonchi, wheezing, or rubs  HEART:irregular rythm   ABDOMEN: Soft, Nontender, Nondistended; Bowel sounds present  NERVOUS SYSTEM:  Alert  not oriented   EXTREMITIES:  2+ Peripheral Pulses, No clubbing, cyanosis, or edema  SKIN;    LABS:                        12.1   7.5   )-----------( 139      ( 02 Nov 2017 06:26 )             39.4     11-02    150<H>  |  114<H>  |  31<H>  ----------------------------<  126<H>  4.1   |  30  |  1.65<H>    Ca    9.0      02 Nov 2017 06:26  Phos  3.3     11-02  Mg     2.8     11-02    TPro  6.1  /  Alb  2.6<L>  /  TBili  0.5  /  DBili  x   /  AST  14  /  ALT  20  /  AlkPhos  75  11-02        CAPILLARY BLOOD GLUCOSE  100 (02 Nov 2017 00:05)      POCT Blood Glucose.: 112 mg/dL (02 Nov 2017 07:00)  POCT Blood Glucose.: 100 mg/dL (02 Nov 2017 00:03)  POCT Blood Glucose.: 125 mg/dL (01 Nov 2017 16:35)  POCT Blood Glucose.: 98 mg/dL (01 Nov 2017 11:50)      RADIOLOGY & ADDITIONAL TESTS:    Imaging Personally Reviewed:  [ ] YES  [ ] NO    Consultant(s) Notes Reviewed:  [ ] YES  [ ] NO Patient is a 89y old  Male who presents with a chief complaint of lethargy (29 Oct 2017 02:22)      INTERVAL HPI/OVERNIGHT EVENTS: patient was made DNR/DNI after family agreed     MEDICATIONS  (STANDING):  acetylcysteine 10% Inhalation 4 milliLiter(s) Inhalation every 6 hours  ALBUTerol/ipratropium for Nebulization 3 milliLiter(s) Nebulizer every 6 hours  aspirin Suppository 300 milliGRAM(s) Rectal daily  dextrose 5%. 1000 milliLiter(s) (50 mL/Hr) IV Continuous <Continuous>  heparin  Injectable 5000 Unit(s) SubCutaneous every 8 hours  influenza   Vaccine 0.5 milliLiter(s) IntraMuscular once  insulin regular  human corrective regimen sliding scale   SubCutaneous every 6 hours  meropenem IVPB 500 milliGRAM(s) IV Intermittent every 12 hours      Allergies    No Known Allergies      REVIEW OF SYSTEMS:  unable to elicit due to medical medication    Vital Signs Last 24 Hrs  T(C): 37.2 (02 Nov 2017 05:19), Max: 37.2 (02 Nov 2017 05:19)  T(F): 98.9 (02 Nov 2017 05:19), Max: 98.9 (02 Nov 2017 05:19)  HR: 81 (02 Nov 2017 05:19) (73 - 84)  BP: 151/85 (02 Nov 2017 05:19) (143/71 - 159/81)  RR: 18 (02 Nov 2017 05:19) (16 - 18)  SpO2: 100% (02 Nov 2017 05:19) (97% - 100%)    PHYSICAL EXAM:  GENERAL: NAD, well-groomed, well-developed  HEAD:  Atraumatic, Normocephalic  EYES: EOMI, PERRL, conjunctiva and sclera clear  NECK: Supple, No JVD, Normal thyroid  CHEST/LUNG: Clear to percussion bilaterally; No rales, rhonchi, wheezing, or rubs  HEART:irregular rythm   ABDOMEN: Soft, Nontender, Nondistended; Bowel sounds present  NERVOUS SYSTEM:  Alert  not oriented   EXTREMITIES:  2+ Peripheral Pulses, No clubbing, cyanosis, or edema  SKIN;    LABS:                        12.1   7.5   )-----------( 139      ( 02 Nov 2017 06:26 )             39.4     11-02    150<H>  |  114<H>  |  31<H>  ----------------------------<  126<H>  4.1   |  30  |  1.65<H>    Ca    9.0      02 Nov 2017 06:26  Phos  3.3     11-02  Mg     2.8     11-02    TPro  6.1  /  Alb  2.6<L>  /  TBili  0.5  /  DBili  x   /  AST  14  /  ALT  20  /  AlkPhos  75  11-02        CAPILLARY BLOOD GLUCOSE  100 (02 Nov 2017 00:05)      POCT Blood Glucose.: 112 mg/dL (02 Nov 2017 07:00)  POCT Blood Glucose.: 100 mg/dL (02 Nov 2017 00:03)  POCT Blood Glucose.: 125 mg/dL (01 Nov 2017 16:35)  POCT Blood Glucose.: 98 mg/dL (01 Nov 2017 11:50)      RADIOLOGY & ADDITIONAL TESTS:    Imaging Personally Reviewed:  [x ] YES  [ ] NO    Consultant(s) Notes Reviewed:  [x ] YES  [ ] NO

## 2017-11-02 NOTE — PROGRESS NOTE ADULT - ATTENDING COMMENTS
Patient seen and examined, agree with above assessment and plan as transcribed above.    - Not a candidate for aggressive cardiac intervention    Mani Reddy MD, [facility], [facility]  [address] [place] [address]  PHONE:  [phone]  BEEPER : [phone]

## 2017-11-02 NOTE — PROGRESS NOTE ADULT - ATTENDING COMMENTS
Patient seen and examined. Patient's history, vitals, labs, imaging studies reviewed. Discussed with above resident, agree with note with edits.   Dinorah Felix MD  11/2/2017

## 2017-11-02 NOTE — PROGRESS NOTE ADULT - PROBLEM SELECTOR PLAN 1
WITH STAPH BACTEREMIA  f/u repeat cultures  c/w meropenem day no 4 per ID  repeat cx are negative   f/u repeat cxr

## 2017-11-02 NOTE — PROGRESS NOTE ADULT - SUBJECTIVE AND OBJECTIVE BOX
Pt resting , NAD on exam.    acetylcysteine 10% Inhalation 4 milliLiter(s) Inhalation every 6 hours  ALBUTerol/ipratropium for Nebulization 3 milliLiter(s) Nebulizer every 6 hours  aspirin Suppository 300 milliGRAM(s) Rectal daily  dextrose 5%. 1000 milliLiter(s) IV Continuous <Continuous>  heparin  Injectable 5000 Unit(s) SubCutaneous every 8 hours  influenza   Vaccine 0.5 milliLiter(s) IntraMuscular once  insulin regular  human corrective regimen sliding scale   SubCutaneous every 6 hours  meropenem IVPB 500 milliGRAM(s) IV Intermittent every 12 hours                            12.1   8.2   )-----------( 139      ( 01 Nov 2017 07:12 )             38.3       Hemoglobin: 12.1 g/dL (11-01 @ 07:12)  Hemoglobin: 11.4 g/dL (10-31 @ 07:35)  Hemoglobin: 12.0 g/dL (10-30 @ 07:20)  Hemoglobin: 12.5 g/dL (10-29 @ 05:51)  Hemoglobin: 13.6 g/dL (10-29 @ 01:14)      11-01    150<H>  |  116<H>  |  32<H>  ----------------------------<  118<H>  3.8   |  26  |  1.81<H>    Ca    9.3      01 Nov 2017 07:12  Phos  3.0     11-01  Mg     2.9     11-01    TPro  6.5  /  Alb  2.8<L>  /  TBili  0.6  /  DBili  x   /  AST  18  /  ALT  23  /  AlkPhos  84  11-01    Creatinine Trend: 1.81<--, 1.69<--, 1.54<--, 1.85<--, 1.78<--, 2.00<--    COAGS:           T(C): 37.2 (11-02-17 @ 05:19), Max: 37.2 (11-02-17 @ 05:19)  HR: 81 (11-02-17 @ 05:19) (73 - 93)  BP: 151/85 (11-02-17 @ 05:19) (143/71 - 162/99)  RR: 18 (11-02-17 @ 05:19) (16 - 18)  SpO2: 100% (11-02-17 @ 05:19) (93% - 100%)  Wt(kg): --    I&O's Summary    01 Nov 2017 07:01  -  02 Nov 2017 06:51  --------------------------------------------------------  IN: 70 mL / OUT: 0 mL / NET: 70 mL        HEENT:   Normal oral mucosa, PERRL, EOMI	  Lymphatic: No lymphadenopathy , no edema  Cardiovascular: Normal S1 S2, No JVD, No murmurs , Peripheral pulses palpable 2+ bilaterally  Respiratory: Lungs clear to auscultation, normal effort 	  Gastrointestinal:  Soft, Non-tender, + BS	      TELEMETRY: Afib       DIAGNOSTIC TESTING:  [ ] Echocardiogram: < from: Transthoracic Echocardiogram (10.30.17 @ 07:20) >  CONCLUSIONS:  1. Normal mitral valve. Mild mitral regurgitation.  2. Calcified trileaflet aortic valve with decreased  opening. Adequate velocities were not obtained to estimate  severity of Aortic stenosis. Mild aortic insufficiency.  3. Aortic Root: 3.8 cm.  4. Severe left atrial enlargement.  5. Mild concentric left ventricular hypertrophy.  6. Dilated LV with severe global left ventricular  dysfunction.  7. Grade II diastolic dysfunction.  8. Severe right atrial enlargement.  9. Right ventricular enlargement with normal RV function.  10. RA Pressure is 15 mm Hg.  11. RV systolic pressure is severely increased at  76 mm  Hg.  12. There is moderate tricuspid regurgitation.  13. There is mild pulmonic regurgitation.  14. Normal pericardium with no pericardial effusion.    < end of copied text >    [ ]  Catheterization:  [ ] Stress Test:    OTHER: 	        ASSESSMENT/PLAN: 	89y Male minimally verbal, with PMH of CAD s/p triple bypass surgery. CHF on diuretics, afib not on AC bradycardia refused PPM in the past admitted with PNA found with an EF of 10-15%    Aspiration precaution   cont asa , supp  ABx per medicine   IV hydration ,   conservative management for CM,   follow up on xray.   palliative consult     D/W Dr Reddy

## 2017-11-02 NOTE — PROGRESS NOTE ADULT - PROBLEM SELECTOR PLAN 2
b/l ronchi  PNA likely from community acquired vs aspiration  and possible underlying acute on chronic systolic chf.  cxr shows possible bilateral lower lobe infiltrate vs pulmonary edema  echo ef of 15 percent  Cardiology consulted no intervention at this time  c/w watchful monitoring

## 2017-11-02 NOTE — PROGRESS NOTE ADULT - ASSESSMENT
Patient is a 89 year old  Male who presents with a chief complaint of lethargy, is admitted to the medicine floor for PNA likely from community acquired  and possible underlying acute on chronic systolic chf. now being evaluated by palliative team for possible disposition to home hospice vs inpatient hospice . Patient is a 89 year old  Male who presents with a chief complaint of lethargy, is admitted to the medicine floor for PNA likely from community acquired  and possible underlying acute on chronic systolic chf, stap bacteremia. now being evaluated by palliative team for possible disposition to home hospice vs inpatient hospice .

## 2017-11-02 NOTE — PROGRESS NOTE ADULT - PROBLEM SELECTOR PLAN 3
sodium of 150  patient not eating  c/w d5 50 cc/hr  Dr Diamond called for nephrology evaluation   not giving per free water deficit as he has EF of 15 percent sodium of 150  patient not eating  c/w d5 at 100 cc/h - discussed with nephrologist Dr. Sabrina Diamond called for nephrology evaluation   not giving per free water deficit as he has EF of 15 percent

## 2017-11-03 DIAGNOSIS — R06.03 ACUTE RESPIRATORY DISTRESS: ICD-10-CM

## 2017-11-03 DIAGNOSIS — F01.50 VASCULAR DEMENTIA WITHOUT BEHAVIORAL DISTURBANCE: ICD-10-CM

## 2017-11-03 DIAGNOSIS — I50.23 ACUTE ON CHRONIC SYSTOLIC (CONGESTIVE) HEART FAILURE: ICD-10-CM

## 2017-11-03 LAB
ALBUMIN SERPL ELPH-MCNC: 2.7 G/DL — LOW (ref 3.5–5)
ALP SERPL-CCNC: 78 U/L — SIGNIFICANT CHANGE UP (ref 40–120)
ALT FLD-CCNC: 20 U/L DA — SIGNIFICANT CHANGE UP (ref 10–60)
ANION GAP SERPL CALC-SCNC: 5 MMOL/L — SIGNIFICANT CHANGE UP (ref 5–17)
ANION GAP SERPL CALC-SCNC: 6 MMOL/L — SIGNIFICANT CHANGE UP (ref 5–17)
AST SERPL-CCNC: 18 U/L — SIGNIFICANT CHANGE UP (ref 10–40)
BASOPHILS # BLD AUTO: 0 K/UL — SIGNIFICANT CHANGE UP (ref 0–0.2)
BASOPHILS NFR BLD AUTO: 0.6 % — SIGNIFICANT CHANGE UP (ref 0–2)
BILIRUB SERPL-MCNC: 0.5 MG/DL — SIGNIFICANT CHANGE UP (ref 0.2–1.2)
BUN SERPL-MCNC: 26 MG/DL — HIGH (ref 7–18)
BUN SERPL-MCNC: 26 MG/DL — HIGH (ref 7–18)
CALCIUM SERPL-MCNC: 9.1 MG/DL — SIGNIFICANT CHANGE UP (ref 8.4–10.5)
CALCIUM SERPL-MCNC: 9.4 MG/DL — SIGNIFICANT CHANGE UP (ref 8.4–10.5)
CHLORIDE SERPL-SCNC: 111 MMOL/L — HIGH (ref 96–108)
CHLORIDE SERPL-SCNC: 111 MMOL/L — HIGH (ref 96–108)
CO2 SERPL-SCNC: 28 MMOL/L — SIGNIFICANT CHANGE UP (ref 22–31)
CO2 SERPL-SCNC: 29 MMOL/L — SIGNIFICANT CHANGE UP (ref 22–31)
CREAT SERPL-MCNC: 1.48 MG/DL — HIGH (ref 0.5–1.3)
CREAT SERPL-MCNC: 1.49 MG/DL — HIGH (ref 0.5–1.3)
CULTURE RESULTS: SIGNIFICANT CHANGE UP
EOSINOPHIL # BLD AUTO: 0.1 K/UL — SIGNIFICANT CHANGE UP (ref 0–0.5)
EOSINOPHIL NFR BLD AUTO: 1.3 % — SIGNIFICANT CHANGE UP (ref 0–6)
GLUCOSE SERPL-MCNC: 105 MG/DL — HIGH (ref 70–99)
GLUCOSE SERPL-MCNC: 142 MG/DL — HIGH (ref 70–99)
HCT VFR BLD CALC: 39.5 % — SIGNIFICANT CHANGE UP (ref 39–50)
HGB BLD-MCNC: 12.1 G/DL — LOW (ref 13–17)
LYMPHOCYTES # BLD AUTO: 0.6 K/UL — LOW (ref 1–3.3)
LYMPHOCYTES # BLD AUTO: 7 % — LOW (ref 13–44)
MCHC RBC-ENTMCNC: 30.6 GM/DL — LOW (ref 32–36)
MCHC RBC-ENTMCNC: 31.5 PG — SIGNIFICANT CHANGE UP (ref 27–34)
MCV RBC AUTO: 102.7 FL — HIGH (ref 80–100)
MONOCYTES # BLD AUTO: 0.8 K/UL — SIGNIFICANT CHANGE UP (ref 0–0.9)
MONOCYTES NFR BLD AUTO: 9.7 % — SIGNIFICANT CHANGE UP (ref 2–14)
NEUTROPHILS # BLD AUTO: 6.7 K/UL — SIGNIFICANT CHANGE UP (ref 1.8–7.4)
NEUTROPHILS NFR BLD AUTO: 81.4 % — HIGH (ref 43–77)
PHOSPHATE SERPL-MCNC: 2.6 MG/DL — SIGNIFICANT CHANGE UP (ref 2.5–4.5)
PLATELET # BLD AUTO: 138 K/UL — LOW (ref 150–400)
POTASSIUM SERPL-MCNC: 3.6 MMOL/L — SIGNIFICANT CHANGE UP (ref 3.5–5.3)
POTASSIUM SERPL-MCNC: 3.9 MMOL/L — SIGNIFICANT CHANGE UP (ref 3.5–5.3)
POTASSIUM SERPL-SCNC: 3.6 MMOL/L — SIGNIFICANT CHANGE UP (ref 3.5–5.3)
POTASSIUM SERPL-SCNC: 3.9 MMOL/L — SIGNIFICANT CHANGE UP (ref 3.5–5.3)
PROT SERPL-MCNC: 6.6 G/DL — SIGNIFICANT CHANGE UP (ref 6–8.3)
RBC # BLD: 3.84 M/UL — LOW (ref 4.2–5.8)
RBC # FLD: 16 % — HIGH (ref 10.3–14.5)
SODIUM SERPL-SCNC: 145 MMOL/L — SIGNIFICANT CHANGE UP (ref 135–145)
SODIUM SERPL-SCNC: 145 MMOL/L — SIGNIFICANT CHANGE UP (ref 135–145)
SPECIMEN SOURCE: SIGNIFICANT CHANGE UP
WBC # BLD: 8.3 K/UL — SIGNIFICANT CHANGE UP (ref 3.8–10.5)
WBC # FLD AUTO: 8.3 K/UL — SIGNIFICANT CHANGE UP (ref 3.8–10.5)

## 2017-11-03 RX ORDER — FUROSEMIDE 40 MG
20 TABLET ORAL ONCE
Qty: 0 | Refills: 0 | Status: COMPLETED | OUTPATIENT
Start: 2017-11-03 | End: 2017-11-03

## 2017-11-03 RX ORDER — ROBINUL 0.2 MG/ML
0.4 INJECTION INTRAMUSCULAR; INTRAVENOUS EVERY 6 HOURS
Qty: 0 | Refills: 0 | Status: DISCONTINUED | OUTPATIENT
Start: 2017-11-03 | End: 2017-11-06

## 2017-11-03 RX ORDER — SODIUM CHLORIDE 9 MG/ML
1000 INJECTION, SOLUTION INTRAVENOUS
Qty: 0 | Refills: 0 | Status: DISCONTINUED | OUTPATIENT
Start: 2017-11-03 | End: 2017-11-03

## 2017-11-03 RX ORDER — MORPHINE SULFATE 50 MG/1
2 CAPSULE, EXTENDED RELEASE ORAL
Qty: 0 | Refills: 0 | Status: DISCONTINUED | OUTPATIENT
Start: 2017-11-03 | End: 2017-11-06

## 2017-11-03 RX ADMIN — HEPARIN SODIUM 5000 UNIT(S): 5000 INJECTION INTRAVENOUS; SUBCUTANEOUS at 05:27

## 2017-11-03 RX ADMIN — Medication 20 MILLIGRAM(S): at 12:09

## 2017-11-03 RX ADMIN — Medication 300 MILLIGRAM(S): at 11:33

## 2017-11-03 RX ADMIN — Medication 3 MILLILITER(S): at 20:50

## 2017-11-03 RX ADMIN — INSULIN HUMAN 2: 100 INJECTION, SOLUTION SUBCUTANEOUS at 12:58

## 2017-11-03 RX ADMIN — MEROPENEM 200 MILLIGRAM(S): 1 INJECTION INTRAVENOUS at 05:27

## 2017-11-03 RX ADMIN — Medication 3 MILLILITER(S): at 15:09

## 2017-11-03 RX ADMIN — Medication 4 MILLILITER(S): at 10:07

## 2017-11-03 RX ADMIN — ROBINUL 0.4 MILLIGRAM(S): 0.2 INJECTION INTRAMUSCULAR; INTRAVENOUS at 17:07

## 2017-11-03 RX ADMIN — MEROPENEM 200 MILLIGRAM(S): 1 INJECTION INTRAVENOUS at 17:07

## 2017-11-03 RX ADMIN — Medication 3 MILLILITER(S): at 10:06

## 2017-11-03 RX ADMIN — SODIUM CHLORIDE 50 MILLILITER(S): 9 INJECTION, SOLUTION INTRAVENOUS at 11:34

## 2017-11-03 RX ADMIN — HEPARIN SODIUM 5000 UNIT(S): 5000 INJECTION INTRAVENOUS; SUBCUTANEOUS at 13:00

## 2017-11-03 NOTE — PROGRESS NOTE ADULT - PROBLEM SELECTOR PLAN 5
Spoke with primary HCP Alejandra on the phone about pt's continued decline and she deferred to her son the secondary HCP Ignacio - he agreed with the above plan and for inpt hospice referral. [facility] will contact him tomorrow morning.

## 2017-11-03 NOTE — PROGRESS NOTE ADULT - SUBJECTIVE AND OBJECTIVE BOX
OVERNIGHT EVENTS:    Present Symptoms:   Dyspnea:   Nausea/Vomiting:   Anxiety:    Depressed Mood:   Fatigue:   Loss of appetite:   Pain:                   location:   Review of Systems: [All others negative or Unable to obtain due to poor mentation]    MEDICATIONS  (STANDING):  acetylcysteine 10% Inhalation 4 milliLiter(s) Inhalation every 6 hours  ALBUTerol/ipratropium for Nebulization 3 milliLiter(s) Nebulizer every 6 hours  aspirin Suppository 300 milliGRAM(s) Rectal daily  dextrose 5%. 1000 milliLiter(s) (50 mL/Hr) IV Continuous <Continuous>  heparin  Injectable 5000 Unit(s) SubCutaneous every 8 hours  influenza   Vaccine 0.5 milliLiter(s) IntraMuscular once  insulin regular  human corrective regimen sliding scale   SubCutaneous every 6 hours  meropenem IVPB 500 milliGRAM(s) IV Intermittent every 12 hours    MEDICATIONS  (PRN):      PHYSICAL EXAM:  Vital Signs Last 24 Hrs  T(C): 36.3 (03 Nov 2017 04:55), Max: 36.8 (02 Nov 2017 21:04)  T(F): 97.4 (03 Nov 2017 04:55), Max: 98.2 (02 Nov 2017 21:04)  HR: 80 (03 Nov 2017 04:55) (78 - 88)  BP: 129/84 (03 Nov 2017 04:55) (129/84 - 177/82)  BP(mean): --  RR: 18 (03 Nov 2017 04:55) (18 - 19)  SpO2: 95% (03 Nov 2017 04:55) (88% - 95%)  General: alert  oriented x ____    [lethargic distressed cachexia  nonverbal  unarousable verbal]  Karnofsky Performance Score/Palliative Performance Status Version2:    %    HEENT: normal  dry mouth  ET tube/trach oral lesions:  Lungs: comfortable tachypnea/labored breathing  excessive secretions  CV: normal  tachycardia  GI: normal  distended  tender  incontinent               PEG/NG/OG tube  constipation  last BM:   : normal  incontinent  oliguria/anuria  melchor  Musculoskeletal: normal  weakness  edema             ambulatory  bedbound/wheelchair bound  Skin: normal  pressure ulcers: stage: edema: other:  Neuro: no deficits cognitive impairment dsyphagia/dysarthria paresis: other:  Oral intake ability: unable/only mouth care [minimal moderate full capability]  Diet: NPO,     LABS:                          12.1   8.3   )-----------( 138      ( 03 Nov 2017 07:57 )             39.5     11-03    145  |  111<H>  |  26<H>  ----------------------------<  142<H>  3.9   |  28  |  1.49<H>    Ca    9.4      03 Nov 2017 07:57  Phos  2.6     11-03  Mg     2.9     11-03    TPro  6.6  /  Alb  2.7<L>  /  TBili  0.5  /  DBili  x   /  AST  18  /  ALT  20  /  AlkPhos  78  11-03        RADIOLOGY & ADDITIONAL STUDIES:    ADVANCE DIRECTIVES: OVERNIGHT EVENTS: still lethargic and mumbling, still congested and labored breathing    Present Symptoms:   Review of Systems: [Unable to obtain due to poor mentation]    MEDICATIONS  (STANDING):  acetylcysteine 10% Inhalation 4 milliLiter(s) Inhalation every 6 hours  ALBUTerol/ipratropium for Nebulization 3 milliLiter(s) Nebulizer every 6 hours  aspirin Suppository 300 milliGRAM(s) Rectal daily  dextrose 5%. 1000 milliLiter(s) (50 mL/Hr) IV Continuous <Continuous>  heparin  Injectable 5000 Unit(s) SubCutaneous every 8 hours  influenza   Vaccine 0.5 milliLiter(s) IntraMuscular once  insulin regular  human corrective regimen sliding scale   SubCutaneous every 6 hours  meropenem IVPB 500 milliGRAM(s) IV Intermittent every 12 hours    MEDICATIONS  (PRN):      PHYSICAL EXAM:  Vital Signs Last 24 Hrs  T(C): 36.3 (03 Nov 2017 04:55), Max: 36.8 (02 Nov 2017 21:04)  T(F): 97.4 (03 Nov 2017 04:55), Max: 98.2 (02 Nov 2017 21:04)  HR: 80 (03 Nov 2017 04:55) (78 - 88)  BP: 129/84 (03 Nov 2017 04:55) (129/84 - 177/82)  BP(mean): --  RR: 18 (03 Nov 2017 04:55) (18 - 19)  SpO2: 95% (03 Nov 2017 04:55) (88% - 95%)  General: alert  oriented x1    [lethargic distressed barely verbal]  Karnofsky Performance Score/Palliative Performance Status Version2:  30  %    HEENT: dry mouth    Lungs: tachypnea/labored breathing  audible congestion and crackles  CV: normal    GI:   incontinent    : incontinent    Musculoskeletal: weakness x4 edema LEs bedbound  Neuro: severe cognitive impairment dsyphagia  Oral intake ability: unable/only mouth care       LABS:                          12.1   8.3   )-----------( 138      ( 03 Nov 2017 07:57 )             39.5     11-03    145  |  111<H>  |  26<H>  ----------------------------<  142<H>  3.9   |  28  |  1.49<H>    Ca    9.4      03 Nov 2017 07:57  Phos  2.6     11-03  Mg     2.9     11-03    TPro  6.6  /  Alb  2.7<L>  /  TBili  0.5  /  DBili  x   /  AST  18  /  ALT  20  /  AlkPhos  78  11-03        RADIOLOGY & ADDITIONAL STUDIES: reviewed     ADVANCE DIRECTIVES: DNR/DNI

## 2017-11-03 NOTE — PROGRESS NOTE ADULT - PROBLEM SELECTOR PLAN 3
was initially moderate few months ago but quickly progressed to endstage. Pt has two HCPs in the chart - Alejandra and Ignacio Mauro.

## 2017-11-03 NOTE — PROGRESS NOTE ADULT - SUBJECTIVE AND OBJECTIVE BOX
Patient is a 89y old  Male who presents with a chief complaint of lethargy (29 Oct 2017 02:22)      INTERVAL HPI/OVERNIGHT EVENTS: no over night events     MEDICATIONS  (STANDING):  acetylcysteine 10% Inhalation 4 milliLiter(s) Inhalation every 6 hours  ALBUTerol/ipratropium for Nebulization 3 milliLiter(s) Nebulizer every 6 hours  aspirin Suppository 300 milliGRAM(s) Rectal daily  dextrose 5%. 1000 milliLiter(s) (50 mL/Hr) IV Continuous <Continuous>  heparin  Injectable 5000 Unit(s) SubCutaneous every 8 hours  influenza   Vaccine 0.5 milliLiter(s) IntraMuscular once  insulin regular  human corrective regimen sliding scale   SubCutaneous every 6 hours  meropenem IVPB 500 milliGRAM(s) IV Intermittent every 12 hours    MEDICATIONS  (PRN):      Allergies    No Known Allergies    Intolerances        REVIEW OF SYSTEMS:  cannot ilicit because of medical condition ,     Vital Signs Last 24 Hrs  T(C): 36.3 (03 Nov 2017 04:55), Max: 36.8 (02 Nov 2017 21:04)  T(F): 97.4 (03 Nov 2017 04:55), Max: 98.2 (02 Nov 2017 21:04)  HR: 80 (03 Nov 2017 04:55) (78 - 88)  BP: 129/84 (03 Nov 2017 04:55) (129/84 - 177/82)  BP(mean): --  RR: 18 (03 Nov 2017 04:55) (18 - 19)  SpO2: 95% (03 Nov 2017 04:55) (88% - 95%)    PHYSICAL EXAM:  GENERAL: mild respiratory distress   HEAD:  Atraumatic, Normocephalic  EYES: EOMI, PERRLA, conjunctiva and sclera clear  NECK: Supple, No JVD, Normal thyroid  CHEST/LUNG: b/l ronchi   HEART: Regular rate and rhythm; \  ABDOMEN: Soft, Nontender, Nondistended; Bowel sounds present  NERVOUS SYSTEM:  Alert not oriented does not follow commands    EXTREMITIES:  2+ Peripheral Pulses, No clubbing, cyanosis, or edema  SKIN;    LABS:                        12.1   8.3   )-----------( 138      ( 03 Nov 2017 07:57 )             39.5     11-03    145  |  111<H>  |  26<H>  ----------------------------<  142<H>  3.9   |  28  |  1.49<H>    Ca    9.4      03 Nov 2017 07:57  Phos  2.6     11-03  Mg     2.9     11-03    TPro  6.6  /  Alb  2.7<L>  /  TBili  0.5  /  DBili  x   /  AST  18  /  ALT  20  /  AlkPhos  78  11-03        CAPILLARY BLOOD GLUCOSE      POCT Blood Glucose.: 145 mg/dL (03 Nov 2017 07:47)  POCT Blood Glucose.: 124 mg/dL (02 Nov 2017 23:58)  POCT Blood Glucose.: 108 mg/dL (02 Nov 2017 15:52)  POCT Blood Glucose.: 130 mg/dL (02 Nov 2017 11:39)      RADIOLOGY & ADDITIONAL TESTS:    Imaging Personally Reviewed:  [ ] YES  [ ] NO    Consultant(s) Notes Reviewed:  [ ] YES  [ ] NO Patient is a 89y old  Male who presents with a chief complaint of lethargy (29 Oct 2017 02:22)      INTERVAL HPI/OVERNIGHT EVENTS: no over night events     MEDICATIONS  (STANDING):  acetylcysteine 10% Inhalation 4 milliLiter(s) Inhalation every 6 hours  ALBUTerol/ipratropium for Nebulization 3 milliLiter(s) Nebulizer every 6 hours  aspirin Suppository 300 milliGRAM(s) Rectal daily  dextrose 5%. 1000 milliLiter(s) (50 mL/Hr) IV Continuous <Continuous>  heparin  Injectable 5000 Unit(s) SubCutaneous every 8 hours  influenza   Vaccine 0.5 milliLiter(s) IntraMuscular once  insulin regular  human corrective regimen sliding scale   SubCutaneous every 6 hours  meropenem IVPB 500 milliGRAM(s) IV Intermittent every 12 hours        Allergies    No Known Allergies            REVIEW OF SYSTEMS:  cannot ilicit because of medical condition ,     Vital Signs Last 24 Hrs  T(C): 36.3 (03 Nov 2017 04:55), Max: 36.8 (02 Nov 2017 21:04)  T(F): 97.4 (03 Nov 2017 04:55), Max: 98.2 (02 Nov 2017 21:04)  HR: 80 (03 Nov 2017 04:55) (78 - 88)  BP: 129/84 (03 Nov 2017 04:55) (129/84 - 177/82)  BP(mean): --  RR: 18 (03 Nov 2017 04:55) (18 - 19)  SpO2: 95% (03 Nov 2017 04:55) (88% - 95%)    PHYSICAL EXAM:  GENERAL: mild respiratory distress   HEAD:  Atraumatic, Normocephalic  EYES: EOMI, PERRL, conjunctiva and sclera clear  NECK: Supple, No JVD, Normal thyroid  CHEST/LUNG: b/l ronchi   HEART: Regular rate and rhythm; \  ABDOMEN: Soft, Nontender, Nondistended; Bowel sounds present  NERVOUS SYSTEM:  Alert not oriented does not follow commands    EXTREMITIES:  2+ Peripheral Pulses, No clubbing, cyanosis, or edema  SKIN;    LABS:                        12.1   8.3   )-----------( 138      ( 03 Nov 2017 07:57 )             39.5     11-03    145  |  111<H>  |  26<H>  ----------------------------<  142<H>  3.9   |  28  |  1.49<H>    Ca    9.4      03 Nov 2017 07:57  Phos  2.6     11-03  Mg     2.9     11-03    TPro  6.6  /  Alb  2.7<L>  /  TBili  0.5  /  DBili  x   /  AST  18  /  ALT  20  /  AlkPhos  78  11-03        CAPILLARY BLOOD GLUCOSE      POCT Blood Glucose.: 145 mg/dL (03 Nov 2017 07:47)  POCT Blood Glucose.: 124 mg/dL (02 Nov 2017 23:58)  POCT Blood Glucose.: 108 mg/dL (02 Nov 2017 15:52)  POCT Blood Glucose.: 130 mg/dL (02 Nov 2017 11:39)      RADIOLOGY & ADDITIONAL TESTS:    Imaging Personally Reviewed:  [ ] YES  [ ] NO    Consultant(s) Notes Reviewed:  [ ] YES  [ ] NO Patient is a 89y old  Male who presents with a chief complaint of lethargy (29 Oct 2017 02:22)      INTERVAL HPI/OVERNIGHT EVENTS: no over night events     MEDICATIONS  (STANDING):  acetylcysteine 10% Inhalation 4 milliLiter(s) Inhalation every 6 hours  ALBUTerol/ipratropium for Nebulization 3 milliLiter(s) Nebulizer every 6 hours  aspirin Suppository 300 milliGRAM(s) Rectal daily  dextrose 5%. 1000 milliLiter(s) (50 mL/Hr) IV Continuous <Continuous>  heparin  Injectable 5000 Unit(s) SubCutaneous every 8 hours  influenza   Vaccine 0.5 milliLiter(s) IntraMuscular once  insulin regular  human corrective regimen sliding scale   SubCutaneous every 6 hours  meropenem IVPB 500 milliGRAM(s) IV Intermittent every 12 hours        Allergies    No Known Allergies    REVIEW OF SYSTEMS:  cannot ilicit because of medical condition, patient with poor mentation    Vital Signs Last 24 Hrs  T(C): 36.3 (03 Nov 2017 04:55), Max: 36.8 (02 Nov 2017 21:04)  T(F): 97.4 (03 Nov 2017 04:55), Max: 98.2 (02 Nov 2017 21:04)  HR: 80 (03 Nov 2017 04:55) (78 - 88)  BP: 129/84 (03 Nov 2017 04:55) (129/84 - 177/82)    RR: 18 (03 Nov 2017 04:55) (18 - 19)  SpO2: 95% (03 Nov 2017 04:55) (88% - 95%)    PHYSICAL EXAM:  GENERAL: mild respiratory distress   HEAD:  Atraumatic, Normocephalic  EYES: EOMI, PERRL, conjunctiva and sclera clear  NECK: Supple, No JVD, Normal thyroid  CHEST/LUNG: b/l ronchi   HEART: Regular rate and rhythm; \  ABDOMEN: Soft, Nontender, Nondistended; Bowel sounds present  NERVOUS SYSTEM:  Alert not oriented does not follow commands    EXTREMITIES:  2+ Peripheral Pulses, No clubbing, cyanosis, or edema      LABS:                        12.1   8.3   )-----------( 138      ( 03 Nov 2017 07:57 )             39.5     11-03    145  |  111<H>  |  26<H>  ----------------------------<  142<H>  3.9   |  28  |  1.49<H>    Ca    9.4      03 Nov 2017 07:57  Phos  2.6     11-03  Mg     2.9     11-03    TPro  6.6  /  Alb  2.7<L>  /  TBili  0.5  /  DBili  x   /  AST  18  /  ALT  20  /  AlkPhos  78  11-03        CAPILLARY BLOOD GLUCOSE      POCT Blood Glucose.: 145 mg/dL (03 Nov 2017 07:47)  POCT Blood Glucose.: 124 mg/dL (02 Nov 2017 23:58)  POCT Blood Glucose.: 108 mg/dL (02 Nov 2017 15:52)  POCT Blood Glucose.: 130 mg/dL (02 Nov 2017 11:39)      RADIOLOGY & ADDITIONAL TESTS:    Imaging Personally Reviewed:  [x ] YES  [ ] NO    Consultant(s) Notes Reviewed:  [x ] YES  [ ] NO

## 2017-11-03 NOTE — PROGRESS NOTE ADULT - SUBJECTIVE AND OBJECTIVE BOX
Patient does'nt speak    ALBUTerol/ipratropium for Nebulization 3 milliLiter(s) Nebulizer every 6 hours  glycopyrrolate Injectable 0.4 milliGRAM(s) IV Push every 6 hours  meropenem IVPB 500 milliGRAM(s) IV Intermittent every 12 hours  morphine  - Injectable 2 milliGRAM(s) IV Push every 3 hours PRN                            12.1   8.3   )-----------( 138      ( 03 Nov 2017 07:57 )             39.5       Hemoglobin: 12.1 g/dL (11-03 @ 07:57)  Hemoglobin: 12.1 g/dL (11-02 @ 06:26)  Hemoglobin: 12.1 g/dL (11-01 @ 07:12)  Hemoglobin: 11.4 g/dL (10-31 @ 07:35)  Hemoglobin: 12.0 g/dL (10-30 @ 07:20)      11-03    145  |  111<H>  |  26<H>  ----------------------------<  142<H>  3.9   |  28  |  1.49<H>    Ca    9.4      03 Nov 2017 07:57  Phos  2.6     11-03  Mg     2.9     11-03    TPro  6.6  /  Alb  2.7<L>  /  TBili  0.5  /  DBili  x   /  AST  18  /  ALT  20  /  AlkPhos  78  11-03    Creatinine Trend: 1.49<--, 1.65<--, 1.81<--, 1.69<--, 1.54<--, 1.85<--    COAGS:           T(C): 36.6 (11-03-17 @ 13:40), Max: 36.8 (11-02-17 @ 21:04)  HR: 82 (11-03-17 @ 13:40) (78 - 82)  BP: 144/105 (11-03-17 @ 13:40) (129/84 - 145/79)  RR: 17 (11-03-17 @ 13:40) (17 - 18)  SpO2: 95% (11-03-17 @ 13:40) (92% - 95%)  Wt(kg): --    I&O's Summary        HEENT:   Normal oral mucosa, PERRL, EOMI	  Lymphatic: No lymphadenopathy , no edema  Cardiovascular: Normal S1 S2, No JVD, No murmurs , Peripheral pulses palpable 2+ bilaterally  Respiratory: Lungs clear to auscultation, normal effort 	  Gastrointestinal:  Soft, Non-tender, + BS	      TELEMETRY: Afib       DIAGNOSTIC TESTING:  [ ] Echocardiogram: < from: Transthoracic Echocardiogram (10.30.17 @ 07:20) >  CONCLUSIONS:  1. Normal mitral valve. Mild mitral regurgitation.  2. Calcified trileaflet aortic valve with decreased  opening. Adequate velocities were not obtained to estimate  severity of Aortic stenosis. Mild aortic insufficiency.  3. Aortic Root: 3.8 cm.  4. Severe left atrial enlargement.  5. Mild concentric left ventricular hypertrophy.  6. Dilated LV with severe global left ventricular  dysfunction.  7. Grade II diastolic dysfunction.  8. Severe right atrial enlargement.  9. Right ventricular enlargement with normal RV function.  10. RA Pressure is 15 mm Hg.  11. RV systolic pressure is severely increased at  76 mm  Hg.  12. There is moderate tricuspid regurgitation.  13. There is mild pulmonic regurgitation.  14. Normal pericardium with no pericardial effusion.    < end of copied text >    [ ]  Catheterization:  [ ] Stress Test:    OTHER: 	        ASSESSMENT/PLAN: 	89y Male minimally verbal, with PMH of CAD s/p triple bypass surgery. CHF on diuretics, afib not on AC bradycardia refused PPM in the past admitted with PNA found with an EF of 10-15%    -Aspiration precaution   -cont asa , supp  -ABx per medicine   - Palliative f/u    Mani Reddy MD, FACC  [facility], [facility]  [address] [place] [address]  PHONE:  [phone]  BEEPER : [phone]

## 2017-11-03 NOTE — PROGRESS NOTE ADULT - ATTENDING COMMENTS
Patient seen and examined. Patient's history, vitals, labs, imaging studies reviewed. Discussed with above resident, agree with note with edits. ID, nephrology, palliative care input appreciated. Transfer to inpatient when bed available.  Dinorah Felix MD  11/3/2017

## 2017-11-03 NOTE — CONSULT NOTE ADULT - ASSESSMENT
89 yr old male with JUDAH and hypernateremia secondary to poor oral intake.   PNA.  coag negative bacteremia likely a contaminant.    cont D5W @ 50CC/HR  give lasix 20MG IVP  rpt BMP at 20mg daily.
possible pneumonia vs CHF - based on cxr but clinically he appears dry    plan - dc rocephin, flagyl and azithromycin  start meropenem 500mgs iv q12hrs

## 2017-11-03 NOTE — PROGRESS NOTE ADULT - PROBLEM SELECTOR PLAN 1
due to severe deconditioning and progression of his CHF - morphine PRN and robinul ATC, oxygen, pt is DNR/DNI as per pt's living will and HCPs

## 2017-11-03 NOTE — PROGRESS NOTE ADULT - PROBLEM SELECTOR PLAN 2
endstage - pt is eligible for inpt hospice- spoke with pt's cardiologist and PMD Dr. Tapia two days ago who also agrees that pt is appropriate for hospice, pt was quickly declining in past year, pt wasn't able to see his doctor for over one year but has been communicating with family intermittently throughout the year.

## 2017-11-03 NOTE — PROGRESS NOTE ADULT - ASSESSMENT
Patient is a 89 year old  Male who presents with a chief complaint of lethargy, is admitted to the medicine floor for PNA likely from community acquired  and possible underlying acute on chronic systolic chf, stap bacteremia. now being evaluated by palliative team for possible disposition to home hospice vs inpatient hospice .

## 2017-11-03 NOTE — PROGRESS NOTE ADULT - PROBLEM SELECTOR PLAN 1
WITH STAPH BACTEREMIA  f/u repeat cultures  c/w meropenem day no 5per ID  cxr shows worsening pneumonia

## 2017-11-04 ENCOUNTER — TRANSCRIPTION ENCOUNTER (OUTPATIENT)
Age: 82
End: 2017-11-04

## 2017-11-04 LAB
ALBUMIN SERPL ELPH-MCNC: 2.4 G/DL — LOW (ref 3.5–5)
ALP SERPL-CCNC: 69 U/L — SIGNIFICANT CHANGE UP (ref 40–120)
ALT FLD-CCNC: 19 U/L DA — SIGNIFICANT CHANGE UP (ref 10–60)
ANION GAP SERPL CALC-SCNC: 5 MMOL/L — SIGNIFICANT CHANGE UP (ref 5–17)
AST SERPL-CCNC: 18 U/L — SIGNIFICANT CHANGE UP (ref 10–40)
BASOPHILS # BLD AUTO: 0.1 K/UL — SIGNIFICANT CHANGE UP (ref 0–0.2)
BASOPHILS NFR BLD AUTO: 0.7 % — SIGNIFICANT CHANGE UP (ref 0–2)
BILIRUB SERPL-MCNC: 0.6 MG/DL — SIGNIFICANT CHANGE UP (ref 0.2–1.2)
BUN SERPL-MCNC: 26 MG/DL — HIGH (ref 7–18)
CALCIUM SERPL-MCNC: 9.1 MG/DL — SIGNIFICANT CHANGE UP (ref 8.4–10.5)
CHLORIDE SERPL-SCNC: 111 MMOL/L — HIGH (ref 96–108)
CO2 SERPL-SCNC: 30 MMOL/L — SIGNIFICANT CHANGE UP (ref 22–31)
CREAT SERPL-MCNC: 1.55 MG/DL — HIGH (ref 0.5–1.3)
CULTURE RESULTS: SIGNIFICANT CHANGE UP
CULTURE RESULTS: SIGNIFICANT CHANGE UP
EOSINOPHIL # BLD AUTO: 0.1 K/UL — SIGNIFICANT CHANGE UP (ref 0–0.5)
EOSINOPHIL NFR BLD AUTO: 1.1 % — SIGNIFICANT CHANGE UP (ref 0–6)
GLUCOSE SERPL-MCNC: 84 MG/DL — SIGNIFICANT CHANGE UP (ref 70–99)
HCT VFR BLD CALC: 37.7 % — LOW (ref 39–50)
HGB BLD-MCNC: 11.7 G/DL — LOW (ref 13–17)
LYMPHOCYTES # BLD AUTO: 0.5 K/UL — LOW (ref 1–3.3)
LYMPHOCYTES # BLD AUTO: 6.1 % — LOW (ref 13–44)
MAGNESIUM SERPL-MCNC: 2.7 MG/DL — HIGH (ref 1.6–2.6)
MCHC RBC-ENTMCNC: 31 GM/DL — LOW (ref 32–36)
MCHC RBC-ENTMCNC: 32.3 PG — SIGNIFICANT CHANGE UP (ref 27–34)
MCV RBC AUTO: 104.1 FL — HIGH (ref 80–100)
MONOCYTES # BLD AUTO: 0.6 K/UL — SIGNIFICANT CHANGE UP (ref 0–0.9)
MONOCYTES NFR BLD AUTO: 7.8 % — SIGNIFICANT CHANGE UP (ref 2–14)
NEUTROPHILS # BLD AUTO: 6.3 K/UL — SIGNIFICANT CHANGE UP (ref 1.8–7.4)
NEUTROPHILS NFR BLD AUTO: 84.2 % — HIGH (ref 43–77)
PHOSPHATE SERPL-MCNC: 3 MG/DL — SIGNIFICANT CHANGE UP (ref 2.5–4.5)
PLATELET # BLD AUTO: 127 K/UL — LOW (ref 150–400)
POTASSIUM SERPL-MCNC: 3.9 MMOL/L — SIGNIFICANT CHANGE UP (ref 3.5–5.3)
POTASSIUM SERPL-SCNC: 3.9 MMOL/L — SIGNIFICANT CHANGE UP (ref 3.5–5.3)
PROT SERPL-MCNC: 5.8 G/DL — LOW (ref 6–8.3)
RBC # BLD: 3.62 M/UL — LOW (ref 4.2–5.8)
RBC # FLD: 16.2 % — HIGH (ref 10.3–14.5)
SODIUM SERPL-SCNC: 146 MMOL/L — HIGH (ref 135–145)
SPECIMEN SOURCE: SIGNIFICANT CHANGE UP
SPECIMEN SOURCE: SIGNIFICANT CHANGE UP
WBC # BLD: 7.5 K/UL — SIGNIFICANT CHANGE UP (ref 3.8–10.5)
WBC # FLD AUTO: 7.5 K/UL — SIGNIFICANT CHANGE UP (ref 3.8–10.5)

## 2017-11-04 RX ORDER — TAMSULOSIN HYDROCHLORIDE 0.4 MG/1
1 CAPSULE ORAL
Qty: 0 | Refills: 0 | COMMUNITY

## 2017-11-04 RX ORDER — FINASTERIDE 5 MG/1
1 TABLET, FILM COATED ORAL
Qty: 0 | Refills: 0 | COMMUNITY

## 2017-11-04 RX ORDER — ASPIRIN/CALCIUM CARB/MAGNESIUM 324 MG
81 TABLET ORAL
Qty: 0 | Refills: 0 | COMMUNITY

## 2017-11-04 RX ORDER — ROBINUL 0.2 MG/ML
0.4 INJECTION INTRAMUSCULAR; INTRAVENOUS
Qty: 0 | Refills: 0 | COMMUNITY
Start: 2017-11-04

## 2017-11-04 RX ORDER — MEROPENEM 1 G/30ML
500 INJECTION INTRAVENOUS
Qty: 0 | Refills: 0 | COMMUNITY
Start: 2017-11-04

## 2017-11-04 RX ORDER — MORPHINE SULFATE 50 MG/1
2 CAPSULE, EXTENDED RELEASE ORAL
Qty: 0 | Refills: 0 | COMMUNITY
Start: 2017-11-04

## 2017-11-04 RX ORDER — LOVASTATIN 20 MG
0 TABLET ORAL
Qty: 0 | Refills: 0 | COMMUNITY

## 2017-11-04 RX ORDER — FAMOTIDINE 10 MG/ML
1 INJECTION INTRAVENOUS
Qty: 0 | Refills: 0 | COMMUNITY

## 2017-11-04 RX ADMIN — Medication 3 MILLILITER(S): at 14:37

## 2017-11-04 RX ADMIN — ROBINUL 0.4 MILLIGRAM(S): 0.2 INJECTION INTRAMUSCULAR; INTRAVENOUS at 00:17

## 2017-11-04 RX ADMIN — Medication 3 MILLILITER(S): at 03:36

## 2017-11-04 RX ADMIN — ROBINUL 0.4 MILLIGRAM(S): 0.2 INJECTION INTRAMUSCULAR; INTRAVENOUS at 05:44

## 2017-11-04 RX ADMIN — MORPHINE SULFATE 2 MILLIGRAM(S): 50 CAPSULE, EXTENDED RELEASE ORAL at 14:35

## 2017-11-04 RX ADMIN — MORPHINE SULFATE 2 MILLIGRAM(S): 50 CAPSULE, EXTENDED RELEASE ORAL at 14:32

## 2017-11-04 RX ADMIN — Medication 3 MILLILITER(S): at 22:03

## 2017-11-04 RX ADMIN — Medication 3 MILLILITER(S): at 10:35

## 2017-11-04 RX ADMIN — MEROPENEM 200 MILLIGRAM(S): 1 INJECTION INTRAVENOUS at 05:45

## 2017-11-04 RX ADMIN — MEROPENEM 200 MILLIGRAM(S): 1 INJECTION INTRAVENOUS at 19:02

## 2017-11-04 RX ADMIN — ROBINUL 0.4 MILLIGRAM(S): 0.2 INJECTION INTRAMUSCULAR; INTRAVENOUS at 12:37

## 2017-11-04 RX ADMIN — ROBINUL 0.4 MILLIGRAM(S): 0.2 INJECTION INTRAMUSCULAR; INTRAVENOUS at 18:33

## 2017-11-04 NOTE — DISCHARGE NOTE ADULT - MEDICATION SUMMARY - MEDICATIONS TO STOP TAKING
I will STOP taking the medications listed below when I get home from the hospital:    finasteride 5 mg oral tablet  -- 1 tab(s) by mouth once a day    famotidine 20 mg oral tablet  -- 1 tab(s) by mouth once a day    tamsulosin 0.4 mg oral capsule  -- 1 cap(s) by mouth once a day    lovastatin    Ecotrin  -- 81 milligram(s) by mouth once a day

## 2017-11-04 NOTE — PROGRESS NOTE ADULT - PROBLEM SELECTOR PLAN 1
WITH STAPH BACTEREMIA  repeat blood cultures - no growth  c/w meropenem day no 6 per ID  cxr shows worsening pneumonia

## 2017-11-04 NOTE — PROGRESS NOTE ADULT - SUBJECTIVE AND OBJECTIVE BOX
Pt resting , NAD on exam.    ALBUTerol/ipratropium for Nebulization 3 milliLiter(s) Nebulizer every 6 hours  glycopyrrolate Injectable 0.4 milliGRAM(s) IV Push every 6 hours  meropenem IVPB 500 milliGRAM(s) IV Intermittent every 12 hours  morphine  - Injectable 2 milliGRAM(s) IV Push every 3 hours PRN                            12.1   8.3   )-----------( 138      ( 03 Nov 2017 07:57 )             39.5       Hemoglobin: 12.1 g/dL (11-03 @ 07:57)  Hemoglobin: 12.1 g/dL (11-02 @ 06:26)  Hemoglobin: 12.1 g/dL (11-01 @ 07:12)  Hemoglobin: 11.4 g/dL (10-31 @ 07:35)      11-04    146<H>  |  111<H>  |  26<H>  ----------------------------<  84  3.9   |  30  |  x     Ca    9.1      04 Nov 2017 07:17  Phos  2.6     11-03  Mg     2.7     11-04    TPro  x   /  Alb  2.4<L>  /  TBili  x   /  DBili  x   /  AST  x   /  ALT  x   /  AlkPhos  x   11-04    Creatinine Trend: 1.48<--, 1.49<--, 1.65<--, 1.81<--, 1.69<--, 1.54<--    COAGS:           T(C): 36.6 (11-04-17 @ 05:26), Max: 36.7 (11-03-17 @ 21:27)  HR: 65 (11-04-17 @ 05:26) (65 - 82)  BP: 117/71 (11-04-17 @ 05:26) (117/71 - 144/105)  RR: 17 (11-04-17 @ 05:26) (17 - 17)  SpO2: 96% (11-04-17 @ 05:26) (90% - 96%)  Wt(kg): --    I&O's Summary    HEENT:   Normal oral mucosa, PERRL, EOMI	  Lymphatic: No lymphadenopathy , no edema  Cardiovascular: Normal S1 S2, No JVD, No murmurs , Peripheral pulses palpable 2+ bilaterally  Respiratory: Lungs clear to auscultation, normal effort 	  Gastrointestinal:  Soft, Non-tender, + BS	      TELEMETRY: Afib       DIAGNOSTIC TESTING:  [ ] Echocardiogram: < from: Transthoracic Echocardiogram (10.30.17 @ 07:20) >  CONCLUSIONS:  1. Normal mitral valve. Mild mitral regurgitation.  2. Calcified trileaflet aortic valve with decreased  opening. Adequate velocities were not obtained to estimate  severity of Aortic stenosis. Mild aortic insufficiency.  3. Aortic Root: 3.8 cm.  4. Severe left atrial enlargement.  5. Mild concentric left ventricular hypertrophy.  6. Dilated LV with severe global left ventricular  dysfunction.  7. Grade II diastolic dysfunction.  8. Severe right atrial enlargement.  9. Right ventricular enlargement with normal RV function.  10. RA Pressure is 15 mm Hg.  11. RV systolic pressure is severely increased at  76 mm  Hg.  12. There is moderate tricuspid regurgitation.  13. There is mild pulmonic regurgitation.  14. Normal pericardium with no pericardial effusion.    < end of copied text >    [ ]  Catheterization:  [ ] Stress Test:    OTHER: 	        ASSESSMENT/PLAN: 	89y Male minimally verbal, with PMH of CAD s/p triple bypass surgery. CHF on diuretics, afib not on AC bradycardia refused PPM in the past admitted with PNA found with an EF of 10-15%    Aspiration precaution   cont asa , supp  ABx per medicine   conservative management for CM,   palliative consult     D/W Dr Reddy

## 2017-11-04 NOTE — DISCHARGE NOTE ADULT - OTHER SIGNIFICANT FINDINGS
Medicine attending note:  Patient for discharge to inpatient hospice today under Dr. St.   Dinorah Felix MD  11/10/2017

## 2017-11-04 NOTE — PROGRESS NOTE ADULT - ASSESSMENT
89 yr old male with JUDAH and hypernateremia secondary to poor oral intake.   SNA slightly increased.  PNA.  very poor LVEF 15%  s/p lasix 20MG IVP  rpt renal profile in AM  palliative care F/U.

## 2017-11-04 NOTE — PROGRESS NOTE ADULT - SUBJECTIVE AND OBJECTIVE BOX
Patient is a 89y old  Male who presents with a chief complaint of lethargy (04 Nov 2017 22:28)      Patient unable to provide history due to poor mentation, lethargy    MEDICATIONS  (STANDING):  ALBUTerol/ipratropium for Nebulization 3 milliLiter(s) Nebulizer every 6 hours  glycopyrrolate Injectable 0.4 milliGRAM(s) IV Push every 6 hours  meropenem IVPB 500 milliGRAM(s) IV Intermittent every 12 hours    MEDICATIONS  (PRN):  morphine  - Injectable 2 milliGRAM(s) IV Push every 3 hours PRN tachypnea, labored breathing        REVIEW OF SYSTEMS:  Unable to obtain secondary to patient's medical condition       PHYSICAL EXAM:    T(C): 36.4 (11-04-17 @ 15:57), Max: 36.8 (11-04-17 @ 11:41)  HR: 82 (11-04-17 @ 15:57) (65 - 85)  BP: 132/76 (11-04-17 @ 15:57) (117/71 - 151/91)  RR: 20 (11-04-17 @ 15:57) (17 - 22)  SpO2: 90% (11-04-17 @ 15:57) (90% - 96%)  I&O's Summary          GENERAL: Elderly male, NAD, comfortable  HEAD:  Atraumatic, Normocephalic  EYES: EOMI, conjunctiva and sclera clear  ENMT: Moist mucous membranes  NECK: Supple,  CHEST/LUNG: b/l rhonchi,  HEART: Regular rate and rhythm; decreased sounds, murmurs  ABDOMEN: Soft, Nontender, Nondistended; Bowel sounds present  EXTREMITIES:  2+ Peripheral Pulses, No clubbing, cyanosis, +edema, weakness x4, bedbound  LYMPH: No lymphadenopathy noted  SKIN: No rashes   Neuro: severe cognitive impairment    LABS:                        11.7   7.5   )-----------( 127      ( 04 Nov 2017 07:17 )             37.7     11-04    146<H>  |  111<H>  |  26<H>  ----------------------------<  84  3.9   |  30  |  1.55<H>    Ca    9.1      04 Nov 2017 07:17  Phos  3.0     11-04  Mg     2.7     11-04    TPro  5.8<L>  /  Alb  2.4<L>  /  TBili  0.6  /  DBili  x   /  AST  18  /  ALT  19  /  AlkPhos  69  11-04        CAPILLARY BLOOD GLUCOSE    RADIOLOGY & ADDITIONAL TESTS:    Imaging Personally Reviewed:  [x ] YES  [ ] NO    Consultant(s) Notes Reviewed:  [x ] YES  [ ] NO    Care Discussed with Consultants/Other Providers [x ] YES  [ ] NO

## 2017-11-04 NOTE — DISCHARGE NOTE ADULT - HOSPITAL COURSE
89 years old male from home, lives alone, walks with walker intermittently, has HHA 24/7, minimally verbal, with PMH of CAD s/p triple bypass surgery. 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 was admitted to medicine for concern of SOB , pna vs chf , patient had echo done which showed EF of 10-15 percent . patient was started on iv meropenem and was seen by infectious disease and cardiology , patient was evaluated by palliative team for severe dementia inability to eat , patient pneumonia got worse during the hospital course , for his low EF cardiology did not rec any aggressive interventions , his condition slowly declined , by the efforts of palliative team living will from family was obtained , stating patient did not want any aggressive life saving measures. patient also developed hypernatremia during the hospital course , patient condition deteriorated and he is now being transfered to inpatient hospice for palliative care. 89 years old male from home, lives alone, walks with walker intermittently, has HHA 24/7, minimally verbal, with PMH of CAD s/p triple bypass surgery. 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 was admitted to medicine for concern of SOB , pna vs chf , patient had echo done which showed EF of 10-15 percent . patient was started on iv meropenem and was seen by infectious disease and cardiology , patient was evaluated by palliative team for severe dementia inability to eat , patient pneumonia got worse during the hospital course , for his low EF cardiology did not rec any aggressive interventions , his condition slowly declined , by the efforts of palliative team living will from family was obtained , stating patient did not want any aggressive life saving measures. Patient also had presented with hypernatremia during the hospital course, which improved with iv fluids. Patient is now being transferred to hospice care as per health care proxy. 89 years old male from home, lives alone, walks with walker intermittently, has HHA 24/7, minimally verbal, with PMH of CAD s/p triple bypass surgery, CHF on diuretics, bradycardia (PPM was recommended in remote past but patient refused it), BPH, ?CKD, and glaucoma came to ED c/o lethargy and generalized weakness for about a week. was admitted to medicine for concern of SOB, pna vs chf. Patient had echo done which showed EF of 10-15 percent . Patient was started on IV antibiotics, meropenem for staph bacteremia and was seen by infectious disease and cardiology. Patient was evaluated by palliative team for severe dementia inability to eat , Patient pneumonia was worse on cxr during the hospital course, was followed by ID, for his low EF cardiology did not rec any aggressive interventions. His prognosis was very poor and his condition slowly declined. By the efforts of palliative team living will from family was obtained, stating patient did not want any aggressive life saving measures. Patient also had presented with hypernatremia during the hospital course, which improved with iv fluids and was seen by nephrology. Patient is now being transferred to hospice care as per health care proxy. 89 years old male from home, lives alone, walks with walker intermittently, has HHA 24/7, minimally verbal, with PMH of CAD s/p triple bypass surgery, CHF on diuretics, bradycardia (PPM was recommended in remote past but patient refused it), BPH, ?CKD, and glaucoma came to ED c/o lethargy and generalized weakness for about a week. was admitted to medicine for concern of SOB, pna vs chf. Patient had echo done which showed EF of 10-15 percent . Patient was started on IV antibiotics, meropenem for staph bacteremia and was seen by infectious disease and cardiology. Patient was evaluated by palliative team for severe dementia inability to eat , Patient pneumonia was worse on cxr during the hospital course, was followed by ID, for his low EF cardiology did not rec any aggressive interventions. His prognosis was very poor and his condition slowly declined. By the efforts of palliative team living will from family was obtained, stating patient did not want any aggressive life saving measures. Patient also had presented with hypernatremia during the hospital course, which improved with iv fluids and was seen and followed by nephrology. Patient is now being transferred to hospice care as per health care proxy.

## 2017-11-04 NOTE — DISCHARGE NOTE ADULT - PLAN OF CARE
comfort care care as per hospice team Patient was admitted for severe shortness of breath. was treated for pneumonia and bacteremia with meropenem. Patient was minimally responsive at that time. prognosis was very poor. Palliative care team was contacted. Family decided patient to be at home hospice. Arranagements were made and patient will be discharged under care of home hospice. comfort care was provided with Lorazepam, morphine, fentanyl patch and scopolamine. Patient had acute kidney injury likely secondary to poor oral intake. Patient came in with elevated serum sodium likely secondary to poor oral in take. Excess of fluids couldn't be given due to ejection fraction of 10% and poor function of heart.

## 2017-11-04 NOTE — DISCHARGE NOTE ADULT - MEDICATION SUMMARY - MEDICATIONS TO TAKE
I will START or STAY ON the medications listed below when I get home from the hospital:    morphine  -- 2 milligram(s) injectable every 3 hours  for resp distress or pain  -- Indication: For Respiratory distress, acute    meropenem 500 mg intravenous injection  -- 500 milligram(s) intravenous every 12 hours  -- Indication: For Pnemonia    glycopyrrolate 0.2 mg/mL injectable solution  -- 0.4 milligram(s) injectable every 6 hours  -- Indication: For Respiratory distress, acute I will START or STAY ON the medications listed below when I get home from the hospital:    fentaNYL 12 mcg/hr transdermal film, extended release  -- 1 patch by transdermal patch every 72 hours  -- Indication: For Comfort care    morphine 20 mg/mL oral concentrate  -- 0.5 milliliter(s) by mouth every 4 hours  -- Indication: For Cofmort care    LORazepam 2 mg/mL oral concentrate  --  by mouth   -- Indication: For Comfort care    scopolamine 1.5 mg transdermal film, extended release  --  by transdermal patch   -- Indication: For Comfort care

## 2017-11-04 NOTE — PROGRESS NOTE ADULT - PROBLEM SELECTOR PLAN 7
palliative following, DNR/DNI now   not eating  candidate for inpatient vs home hospice palliative following, DNR/DNI now   not eating  candidate for inpatient vs home hospice  discussed with Dr. Gabriel HCP deciding between  inpatient vs home hospice, likely by Monday

## 2017-11-04 NOTE — PROGRESS NOTE ADULT - ATTENDING COMMENTS
Patient discharged to inpatient hospice today. See resident discharge note for details.  Dinorah Felix MD  11/4/2017 Patient for discharged to home vs inpatient hospice.  Dinorah Felix MD  11/4/2017

## 2017-11-04 NOTE — DISCHARGE NOTE ADULT - PATIENT PORTAL LINK FT
“You can access the [employer] Patient Portal, offered by [facility], by registering with the following website: [email or web address]”

## 2017-11-04 NOTE — PROGRESS NOTE ADULT - SUBJECTIVE AND OBJECTIVE BOX
LETHARGIC    No Known Allergies    Hospital Medications:   MEDICATIONS  (STANDING):  ALBUTerol/ipratropium for Nebulization 3 milliLiter(s) Nebulizer every 6 hours  glycopyrrolate Injectable 0.4 milliGRAM(s) IV Push every 6 hours  meropenem IVPB 500 milliGRAM(s) IV Intermittent every 12 hours    REVIEW OF SYSTEMS:  CONSTITUTIONAL: No weakness, fevers or chills  EYES/ENT: No visual changes;  No vertigo or throat pain   NECK: No pain or stiffness  RESPIRATORY: No cough, wheezing, hemoptysis; No shortness of breath  CARDIOVASCULAR: No chest pain or palpitations.  GASTROINTESTINAL: No abdominal or epigastric pain. No nausea, vomiting, or hematemesis; No diarrhea or constipation. No melena or hematochezia.  GENITOURINARY: No dysuria, frequency, foamy urine, urinary urgency, incontinence or hematuria  NEUROLOGICAL: No numbness or weakness  SKIN: No itching, burning, rashes, or lesions   VASCULAR: No bilateral lower extremity edema.   All other review of systems is negative unless indicated above.    VITALS:  T(F): 98.3 (17 @ 11:41), Max: 98.3 (17 @ 11:41)  HR: 85 (17 @ 11:41)  BP: 151/91 (17 @ 11:41)  RR: 22 (17 @ 11:41)  SpO2: 94% (17 @ 11:41)  Wt(kg): --      PHYSICAL EXAM:  Constitutional: NAD  HEENT: anicteric sclera, oropharynx clear,   Neck: No JVD  Respiratory: BILAT rales or rhonchi  Cardiovascular: S1, S2, RRR  Gastrointestinal: BS+, soft, NT/ND  Extremities:  peripheral edema  Neurological: A/O x 3, no focal deficits  Psychiatric: Normal mood, normal affect      LABS:      146<H>  |  111<H>  |  26<H>  ----------------------------<  84  3.9   |  30  |  1.55<H>    Ca    9.1      2017 07:17  Phos  3.0       Mg     2.7         TPro  5.8<L>  /  Alb  2.4<L>  /  TBili  0.6  /  DBili      /  AST  18  /  ALT  19  /  AlkPhos  69  11-04    Creatinine Trend: 1.55 <--, 1.48 <--, 1.49 <--, 1.65 <--, 1.81 <--, 1.69 <--, 1.54 <--, 1.85 <--, 1.78 <--, 2.00 <--                        11.7   7.5   )-----------( 127      ( 2017 07:17 )             37.7     Urine Studies:  Urinalysis Basic - ( 29 Oct 2017 01:58 )    Color: Yellow / Appearance: Clear / S.025 / pH:   Gluc:  / Ketone: Trace  / Bili: Negative / Urobili: Negative   Blood:  / Protein: 500 mg/dL / Nitrite: Negative   Leuk Esterase: Negative / RBC: 2-5 /HPF / WBC 0-2 /HPF   Sq Epi:  / Non Sq Epi: Few /HPF / Bacteria:         RADIOLOGY & ADDITIONAL STUDIES:

## 2017-11-05 RX ADMIN — ROBINUL 0.4 MILLIGRAM(S): 0.2 INJECTION INTRAMUSCULAR; INTRAVENOUS at 18:49

## 2017-11-05 RX ADMIN — MORPHINE SULFATE 2 MILLIGRAM(S): 50 CAPSULE, EXTENDED RELEASE ORAL at 12:56

## 2017-11-05 RX ADMIN — MORPHINE SULFATE 2 MILLIGRAM(S): 50 CAPSULE, EXTENDED RELEASE ORAL at 06:00

## 2017-11-05 RX ADMIN — ROBINUL 0.4 MILLIGRAM(S): 0.2 INJECTION INTRAMUSCULAR; INTRAVENOUS at 05:31

## 2017-11-05 RX ADMIN — ROBINUL 0.4 MILLIGRAM(S): 0.2 INJECTION INTRAMUSCULAR; INTRAVENOUS at 13:00

## 2017-11-05 RX ADMIN — MEROPENEM 200 MILLIGRAM(S): 1 INJECTION INTRAVENOUS at 18:48

## 2017-11-05 RX ADMIN — Medication 3 MILLILITER(S): at 15:25

## 2017-11-05 RX ADMIN — Medication 3 MILLILITER(S): at 02:36

## 2017-11-05 RX ADMIN — MEROPENEM 200 MILLIGRAM(S): 1 INJECTION INTRAVENOUS at 05:35

## 2017-11-05 RX ADMIN — Medication 3 MILLILITER(S): at 21:09

## 2017-11-05 RX ADMIN — ROBINUL 0.4 MILLIGRAM(S): 0.2 INJECTION INTRAMUSCULAR; INTRAVENOUS at 00:11

## 2017-11-05 RX ADMIN — MORPHINE SULFATE 2 MILLIGRAM(S): 50 CAPSULE, EXTENDED RELEASE ORAL at 18:41

## 2017-11-05 RX ADMIN — MORPHINE SULFATE 2 MILLIGRAM(S): 50 CAPSULE, EXTENDED RELEASE ORAL at 05:35

## 2017-11-05 RX ADMIN — MORPHINE SULFATE 2 MILLIGRAM(S): 50 CAPSULE, EXTENDED RELEASE ORAL at 18:50

## 2017-11-05 RX ADMIN — Medication 3 MILLILITER(S): at 09:28

## 2017-11-05 RX ADMIN — MORPHINE SULFATE 2 MILLIGRAM(S): 50 CAPSULE, EXTENDED RELEASE ORAL at 13:00

## 2017-11-05 NOTE — PROGRESS NOTE ADULT - PROBLEM SELECTOR PLAN 7
palliative following, DNR/DNI now   not eating  candidate for inpatient vs home hospice  discussed with Dr. Gabriel HCP deciding between  inpatient vs home hospice, likely by Monday palliative following, DNR/DNI now   not eating  candidate for inpatient vs home hospice  discussed with Dr. Gabriel,  HCP deciding between  inpatient vs home hospice, likely in 1-2 days

## 2017-11-05 NOTE — PROGRESS NOTE ADULT - ATTENDING COMMENTS
Patient seen and examined. Patient's history, vitals, labs, imaging studies reviewed. Discussed with resident, agree with note with edits. Plan is most likely for home hospice when arrangements made. Plan of care discussed with patient, and HCP at bedside and agrees, all questions answered.   Dinorah Felix MD  11/5/2017

## 2017-11-05 NOTE — PROGRESS NOTE ADULT - SUBJECTIVE AND OBJECTIVE BOX
LETHARGIC    No Known Allergies    Hospital Medications:   MEDICATIONS  (STANDING):  ALBUTerol/ipratropium for Nebulization 3 milliLiter(s) Nebulizer every 6 hours  glycopyrrolate Injectable 0.4 milliGRAM(s) IV Push every 6 hours  meropenem IVPB 500 milliGRAM(s) IV Intermittent every 12 hours      VITALS:  T(F): 97.5 (11-05-17 @ 07:16), Max: 98.3 (11-04-17 @ 11:41)  HR: 92 (11-05-17 @ 07:16)  BP: 157/91 (11-05-17 @ 07:16)  RR: 20 (11-05-17 @ 07:16)  SpO2: 95% (11-05-17 @ 07:16)  Wt(kg): --      PHYSICAL EXAM:  Constitutional: NAD  HEENT: anicteric sclera, oropharynx clear.  Neck: No JVD  Respiratory: CTAB, no wheezes, rales or rhonchi  Cardiovascular: S1, S2, RRR  Gastrointestinal: BS+, soft, NT/ND  Extremities:  peripheral edema  Neurological:  lethargic. no focal deficits      LABS:  11-04    146<H>  |  111<H>  |  26<H>  ----------------------------<  84  3.9   |  30  |  1.55<H>    Ca    9.1      04 Nov 2017 07:17  Phos  3.0     11-04  Mg     2.7     11-04    TPro  5.8<L>  /  Alb  2.4<L>  /  TBili  0.6  /  DBili      /  AST  18  /  ALT  19  /  AlkPhos  69  11-04    Creatinine Trend: 1.55 <--, 1.48 <--, 1.49 <--, 1.65 <--, 1.81 <--, 1.69 <--, 1.54 <--, 1.85 <--                        11.7   7.5   )-----------( 127      ( 04 Nov 2017 07:17 )             37.7     Urine Studies:      RADIOLOGY & ADDITIONAL STUDIES:

## 2017-11-05 NOTE — PROGRESS NOTE ADULT - SUBJECTIVE AND OBJECTIVE BOX
PGY1 Note discussed with supervising resident and primary attending.    Patient is a 89y old  Male who presents with a chief complaint of lethargy (04 Nov 2017 22:28)      INTERVAL HPI/OVERNIGHT EVENTS: No overnight events.    MEDICATIONS  (STANDING):  ALBUTerol/ipratropium for Nebulization 3 milliLiter(s) Nebulizer every 6 hours  glycopyrrolate Injectable 0.4 milliGRAM(s) IV Push every 6 hours  meropenem IVPB 500 milliGRAM(s) IV Intermittent every 12 hours    MEDICATIONS  (PRN):  morphine  - Injectable 2 milliGRAM(s) IV Push every 3 hours PRN tachypnea, labored breathing      Allergies    No Known Allergies    Intolerances        REVIEW OF SYSTEMS:  CONSTITUTIONAL: No fever, weight loss, or fatigue  RESPIRATORY: No cough, wheezing, chills or hemoptysis; No shortness of breath  CARDIOVASCULAR: No chest pain, palpitations, dizziness, or leg swelling  GASTROINTESTINAL: No abdominal or epigastric pain. No nausea, vomiting, or hematemesis; No diarrhea or constipation. No melena or hematochezia.  NEUROLOGICAL: No headaches, memory loss, loss of strength, numbness, or tremors  SKIN: No itching, burning, rashes, or lesions     Vital Signs Last 24 Hrs  T(C): 36.4 (05 Nov 2017 07:16), Max: 36.8 (04 Nov 2017 11:41)  T(F): 97.5 (05 Nov 2017 07:16), Max: 98.3 (04 Nov 2017 11:41)  HR: 92 (05 Nov 2017 07:16) (78 - 92)  BP: 157/91 (05 Nov 2017 07:16) (132/76 - 157/91)  BP(mean): --  RR: 20 (05 Nov 2017 07:16) (18 - 22)  SpO2: 95% (05 Nov 2017 07:16) (90% - 95%)    PHYSICAL EXAM:  GENERAL: NAD, well-groomed, well-developed  HEAD:  Atraumatic, Normocephalic  EYES: EOMI, PERRLA, conjunctiva and sclera clear  NECK: Supple, No JVD, Normal thyroid  CHEST/LUNG: Clear to percussion bilaterally; No rales, rhonchi, wheezing, or rubs  HEART: Regular rate and rhythm; No murmurs, rubs, or gallops  ABDOMEN: Soft, Nontender, Nondistended; Bowel sounds present  NERVOUS SYSTEM:  Alert & Oriented X3, Good concentration; Motor Strength 5/5 B/L   EXTREMITIES:  2+ Peripheral Pulses, No clubbing, cyanosis, or edema  SKIN;    LABS:                        11.7   7.5   )-----------( 127      ( 04 Nov 2017 07:17 )             37.7     11-04    146<H>  |  111<H>  |  26<H>  ----------------------------<  84  3.9   |  30  |  1.55<H>    Ca    9.1      04 Nov 2017 07:17  Phos  3.0     11-04  Mg     2.7     11-04    TPro  5.8<L>  /  Alb  2.4<L>  /  TBili  0.6  /  DBili  x   /  AST  18  /  ALT  19  /  AlkPhos  69  11-04        CAPILLARY BLOOD GLUCOSE          RADIOLOGY & ADDITIONAL TESTS:    Imaging Personally Reviewed:  [x ] YES  [ ] NO    Consultant(s) Notes Reviewed:  [x ] YES  [ ] NO PGY1 Note discussed with supervising resident and primary attending.    Patient is a 89y old  Male who presents with a chief complaint of lethargy (04 Nov 2017 22:28)      INTERVAL HPI/OVERNIGHT EVENTS: No overnight events.    MEDICATIONS  (STANDING):  ALBUTerol/ipratropium for Nebulization 3 milliLiter(s) Nebulizer every 6 hours  glycopyrrolate Injectable 0.4 milliGRAM(s) IV Push every 6 hours  meropenem IVPB 500 milliGRAM(s) IV Intermittent every 12 hours    MEDICATIONS  (PRN):  morphine  - Injectable 2 milliGRAM(s) IV Push every 3 hours PRN tachypnea, labored breathing      Allergies    No Known Allergies      REVIEW OF SYSTEMS: unable to obtain secondary to patient's mentation and medical condition    Vital Signs Last 24 Hrs  T(C): 36.4 (05 Nov 2017 07:16), Max: 36.8 (04 Nov 2017 11:41)  T(F): 97.5 (05 Nov 2017 07:16), Max: 98.3 (04 Nov 2017 11:41)  HR: 92 (05 Nov 2017 07:16) (78 - 92)  BP: 157/91 (05 Nov 2017 07:16) (132/76 - 157/91)  RR: 20 (05 Nov 2017 07:16) (18 - 22)  SpO2: 95% (05 Nov 2017 07:16) (90% - 95%)    PHYSICAL EXAM:  GENERAL: Elderly male, NAD, comfortable  HEAD:  Atraumatic, Normocephalic  EYES: EOMI, conjunctiva and sclera clear  ENMT: Moist mucous membranes  NECK: Supple,  CHEST/LUNG: b/l rhonchi,  HEART: Regular rate and rhythm; decreased sounds, murmurs  ABDOMEN: Soft, Nontender, Nondistended; Bowel sounds present  EXTREMITIES:  2+ Peripheral Pulses, No clubbing, cyanosis, +edema, weakness x4, bedbound  LYMPH: No lymphadenopathy noted  SKIN: No rashes   Neuro: severe cognitive impairment      LABS:                        11.7   7.5   )-----------( 127      ( 04 Nov 2017 07:17 )             37.7     11-04    146<H>  |  111<H>  |  26<H>  ----------------------------<  84  3.9   |  30  |  1.55<H>    Ca    9.1      04 Nov 2017 07:17  Phos  3.0     11-04  Mg     2.7     11-04    TPro  5.8<L>  /  Alb  2.4<L>  /  TBili  0.6  /  DBili  x   /  AST  18  /  ALT  19  /  AlkPhos  69  11-04        CAPILLARY BLOOD GLUCOSE          RADIOLOGY & ADDITIONAL TESTS:    Imaging Personally Reviewed:  [x ] YES  [ ] NO    Consultant(s) Notes Reviewed:  [x ] YES  [ ] NO

## 2017-11-05 NOTE — PROGRESS NOTE ADULT - ASSESSMENT
89 yr old male with JUDAH and hypernateremia secondary to poor oral intake.   SNA s increased.  PNA.  very poor LVEF 15%  rpt renal profile in AM  plans for hospice care.  will sign off. thank you

## 2017-11-05 NOTE — PROGRESS NOTE ADULT - SUBJECTIVE AND OBJECTIVE BOX
pt seen and examined, no complaints on exam.   NAD on exam , resting comfortably     ALBUTerol/ipratropium for Nebulization 3 milliLiter(s) Nebulizer every 6 hours  glycopyrrolate Injectable 0.4 milliGRAM(s) IV Push every 6 hours  meropenem IVPB 500 milliGRAM(s) IV Intermittent every 12 hours  morphine  - Injectable 2 milliGRAM(s) IV Push every 3 hours PRN                            11.7   7.5   )-----------( 127      ( 04 Nov 2017 07:17 )             37.7       Hemoglobin: 11.7 g/dL (11-04 @ 07:17)  Hemoglobin: 12.1 g/dL (11-03 @ 07:57)  Hemoglobin: 12.1 g/dL (11-02 @ 06:26)  Hemoglobin: 12.1 g/dL (11-01 @ 07:12)  Hemoglobin: 11.4 g/dL (10-31 @ 07:35)      11-04    146<H>  |  111<H>  |  26<H>  ----------------------------<  84  3.9   |  30  |  1.55<H>    Ca    9.1      04 Nov 2017 07:17  Phos  3.0     11-04  Mg     2.7     11-04    TPro  5.8<L>  /  Alb  2.4<L>  /  TBili  0.6  /  DBili  x   /  AST  18  /  ALT  19  /  AlkPhos  69  11-04    Creatinine Trend: 1.55<--, 1.48<--, 1.49<--, 1.65<--, 1.81<--, 1.69<--    COAGS:           T(C): 36.3 (11-04-17 @ 23:04), Max: 36.8 (11-04-17 @ 11:41)  HR: 78 (11-04-17 @ 23:04) (78 - 85)  BP: 144/62 (11-04-17 @ 23:04) (132/76 - 151/91)  RR: 18 (11-04-17 @ 23:04) (18 - 22)  SpO2: 91% (11-04-17 @ 23:04) (90% - 94%)  Wt(kg): --    I&O's Summary      HEENT:   Normal oral mucosa, PERRL, EOMI	  Lymphatic: No lymphadenopathy , no edema  Cardiovascular: Normal S1 S2, No JVD, No murmurs , Peripheral pulses palpable 2+ bilaterally  Respiratory: Lungs clear to auscultation, normal effort 	  Gastrointestinal:  Soft, Non-tender, + BS	      TELEMETRY: Afib       DIAGNOSTIC TESTING:  [ ] Echocardiogram: < from: Transthoracic Echocardiogram (10.30.17 @ 07:20) >  CONCLUSIONS:  1. Normal mitral valve. Mild mitral regurgitation.  2. Calcified trileaflet aortic valve with decreased  opening. Adequate velocities were not obtained to estimate  severity of Aortic stenosis. Mild aortic insufficiency.  3. Aortic Root: 3.8 cm.  4. Severe left atrial enlargement.  5. Mild concentric left ventricular hypertrophy.  6. Dilated LV with severe global left ventricular  dysfunction.  7. Grade II diastolic dysfunction.  8. Severe right atrial enlargement.  9. Right ventricular enlargement with normal RV function.  10. RA Pressure is 15 mm Hg.  11. RV systolic pressure is severely increased at  76 mm  Hg.  12. There is moderate tricuspid regurgitation.  13. There is mild pulmonic regurgitation.  14. Normal pericardium with no pericardial effusion.    < end of copied text >    [ ]  Catheterization:  [ ] Stress Test:    OTHER: 	        ASSESSMENT/PLAN: 	89y Male minimally verbal, with PMH of CAD s/p triple bypass surgery. CHF on diuretics, afib not on AC bradycardia refused PPM in the past admitted with PNA found with an EF of 10-15%    Aspiration precaution   cont asa , supp  ABx per medicine   conservative management for CM,,   pt appears euvolemic on exam.   palliative consult     D/W Dr Reddy

## 2017-11-05 NOTE — PROGRESS NOTE ADULT - PROBLEM SELECTOR PLAN 1
WITH STAPH BACTEREMIA  repeat blood cultures - no growth  c/w meropenem day no 6 per ID  cxr shows worsening pneumonia WITH STAPH BACTEREMIA  repeat blood cultures - no growth  c/w meropenem day no 7 per ID  cxr shows worsening pneumonia

## 2017-11-06 DIAGNOSIS — R06.00 DYSPNEA, UNSPECIFIED: ICD-10-CM

## 2017-11-06 DIAGNOSIS — R53.2 FUNCTIONAL QUADRIPLEGIA: ICD-10-CM

## 2017-11-06 DIAGNOSIS — I50.9 HEART FAILURE, UNSPECIFIED: ICD-10-CM

## 2017-11-06 PROCEDURE — 99233 SBSQ HOSP IP/OBS HIGH 50: CPT

## 2017-11-06 RX ORDER — MORPHINE SULFATE 50 MG/1
2 CAPSULE, EXTENDED RELEASE ORAL
Qty: 0 | Refills: 0 | Status: DISCONTINUED | OUTPATIENT
Start: 2017-11-06 | End: 2017-11-10

## 2017-11-06 RX ORDER — MEROPENEM 1 G/30ML
500 INJECTION INTRAVENOUS EVERY 12 HOURS
Qty: 0 | Refills: 0 | Status: DISCONTINUED | OUTPATIENT
Start: 2017-11-06 | End: 2017-11-07

## 2017-11-06 RX ORDER — ROBINUL 0.2 MG/ML
0.4 INJECTION INTRAMUSCULAR; INTRAVENOUS EVERY 6 HOURS
Qty: 0 | Refills: 0 | Status: DISCONTINUED | OUTPATIENT
Start: 2017-11-06 | End: 2017-11-10

## 2017-11-06 RX ORDER — MORPHINE SULFATE 50 MG/1
5 CAPSULE, EXTENDED RELEASE ORAL EVERY 4 HOURS
Qty: 0 | Refills: 0 | Status: DISCONTINUED | OUTPATIENT
Start: 2017-11-06 | End: 2017-11-07

## 2017-11-06 RX ORDER — SCOPALAMINE 1 MG/3D
1.5 PATCH, EXTENDED RELEASE TRANSDERMAL
Qty: 0 | Refills: 0 | Status: DISCONTINUED | OUTPATIENT
Start: 2017-11-06 | End: 2017-11-10

## 2017-11-06 RX ORDER — MORPHINE SULFATE 50 MG/1
5 CAPSULE, EXTENDED RELEASE ORAL
Qty: 0 | Refills: 0 | Status: DISCONTINUED | OUTPATIENT
Start: 2017-11-06 | End: 2017-11-10

## 2017-11-06 RX ADMIN — MORPHINE SULFATE 2 MILLIGRAM(S): 50 CAPSULE, EXTENDED RELEASE ORAL at 08:25

## 2017-11-06 RX ADMIN — ROBINUL 0.4 MILLIGRAM(S): 0.2 INJECTION INTRAMUSCULAR; INTRAVENOUS at 12:13

## 2017-11-06 RX ADMIN — Medication 3 MILLILITER(S): at 09:14

## 2017-11-06 RX ADMIN — MORPHINE SULFATE 2 MILLIGRAM(S): 50 CAPSULE, EXTENDED RELEASE ORAL at 04:35

## 2017-11-06 RX ADMIN — ROBINUL 0.4 MILLIGRAM(S): 0.2 INJECTION INTRAMUSCULAR; INTRAVENOUS at 06:22

## 2017-11-06 RX ADMIN — ROBINUL 0.4 MILLIGRAM(S): 0.2 INJECTION INTRAMUSCULAR; INTRAVENOUS at 00:26

## 2017-11-06 RX ADMIN — MORPHINE SULFATE 2 MILLIGRAM(S): 50 CAPSULE, EXTENDED RELEASE ORAL at 12:13

## 2017-11-06 RX ADMIN — MORPHINE SULFATE 5 MILLIGRAM(S): 50 CAPSULE, EXTENDED RELEASE ORAL at 19:19

## 2017-11-06 RX ADMIN — MORPHINE SULFATE 5 MILLIGRAM(S): 50 CAPSULE, EXTENDED RELEASE ORAL at 17:57

## 2017-11-06 RX ADMIN — MORPHINE SULFATE 2 MILLIGRAM(S): 50 CAPSULE, EXTENDED RELEASE ORAL at 04:20

## 2017-11-06 RX ADMIN — SCOPALAMINE 1.5 MILLIGRAM(S): 1 PATCH, EXTENDED RELEASE TRANSDERMAL at 17:58

## 2017-11-06 RX ADMIN — MORPHINE SULFATE 5 MILLIGRAM(S): 50 CAPSULE, EXTENDED RELEASE ORAL at 21:53

## 2017-11-06 RX ADMIN — Medication 3 MILLILITER(S): at 15:12

## 2017-11-06 RX ADMIN — Medication 3 MILLILITER(S): at 20:56

## 2017-11-06 RX ADMIN — MORPHINE SULFATE 5 MILLIGRAM(S): 50 CAPSULE, EXTENDED RELEASE ORAL at 21:54

## 2017-11-06 RX ADMIN — MORPHINE SULFATE 2 MILLIGRAM(S): 50 CAPSULE, EXTENDED RELEASE ORAL at 12:58

## 2017-11-06 RX ADMIN — MEROPENEM 200 MILLIGRAM(S): 1 INJECTION INTRAVENOUS at 17:57

## 2017-11-06 RX ADMIN — MEROPENEM 200 MILLIGRAM(S): 1 INJECTION INTRAVENOUS at 08:49

## 2017-11-06 RX ADMIN — MORPHINE SULFATE 2 MILLIGRAM(S): 50 CAPSULE, EXTENDED RELEASE ORAL at 08:51

## 2017-11-06 NOTE — PROGRESS NOTE ADULT - PROBLEM SELECTOR PLAN 1
WITH STAPH BACTEREMIA  repeat blood cultures - no growth  c/w meropenem day needs two more days.  cxr shows worsening pneumonia

## 2017-11-06 NOTE — PROGRESS NOTE ADULT - SUBJECTIVE AND OBJECTIVE BOX
PGY 1 Note discussed with supervising resident and primary attending    Patient is a 89y old  Male who presents with a chief complaint of lethargy (04 Nov 2017 22:28)      INTERVAL HPI/OVERNIGHT EVENTS: offers no new complaints; current symptoms resolving. Patient breathing heavily.    MEDICATIONS  (STANDING):  ALBUTerol/ipratropium for Nebulization 3 milliLiter(s) Nebulizer every 6 hours  meropenem IVPB 500 milliGRAM(s) IV Intermittent every 12 hours  morphine Concentrate 5 milliGRAM(s) Oral every 4 hours  scopolamine   Patch 1.5 milliGRAM(s) Transdermal every 3 days    MEDICATIONS  (PRN):  glycopyrrolate Injectable 0.4 milliGRAM(s) IV Push every 6 hours PRN Secretions  LORazepam     Tablet 0.5 milliGRAM(s) Oral every 4 hours PRN Agitation  morphine  - Injectable 2 milliGRAM(s) IV Push every 1 hour PRN Dyspnea  morphine Concentrate 5 milliGRAM(s) Oral every 2 hours PRN Dyspnea      __________________________________________________  REVIEW OF SYSTEMS:    CONSTITUTIONAL: No fever,   EYES: no acute visual disturbances  NECK: No pain or stiffness  RESPIRATORY: No cough; No shortness of breath  CARDIOVASCULAR: No chest pain, no palpitations  GASTROINTESTINAL: No pain. No nausea or vomiting; No diarrhea   NEUROLOGICAL: No headache or numbness, no tremors  MUSCULOSKELETAL: No joint pain, no muscle pain  GENITOURINARY: no dysuria, no frequency, no hesitancy  PSYCHIATRY: no depression , no anxiety  ALL OTHER  ROS negative        Vital Signs Last 24 Hrs  T(C): 36.3 (06 Nov 2017 15:22), Max: 36.6 (06 Nov 2017 07:25)  T(F): 97.4 (06 Nov 2017 15:22), Max: 97.8 (06 Nov 2017 07:25)  HR: 95 (06 Nov 2017 15:22) (83 - 108)  BP: 143/78 (06 Nov 2017 15:22) (143/78 - 151/61)  BP(mean): --  RR: 20 (06 Nov 2017 15:22) (20 - 20)  SpO2: 94% (06 Nov 2017 15:22) (92% - 94%)    ________________________________________________  PHYSICAL EXAM:  GENERAL: NAD  HEENT: Normocephalic;  conjunctivae and sclerae clear; moist mucous membranes;   NECK : supple  CHEST/LUNG: Clear to auscultation bilaterally with good air entry   HEART: S1 S2  regular; no murmurs, gallops or rubs  ABDOMEN: Soft, Nontender, Nondistended; Bowel sounds present  EXTREMITIES: no cyanosis; no edema; no calf tenderness  SKIN: warm and dry; no rash  NERVOUS SYSTEM:  drowsy. non responsive.    _________________________________________________  LABS:              CAPILLARY BLOOD GLUCOSE            RADIOLOGY & ADDITIONAL TESTS:    Imaging Personally Reviewed:  YES    Consultant(s) Notes Reviewed:   YES    Care Discussed with Consultants :     Plan of care was discussed with patient and /or primary care giver; all questions and concerns were addressed and care was aligned with patient's wishes. PGY 1 Note discussed with supervising resident and primary attending    Patient is a 89y old  Male who presents with a chief complaint of lethargy (04 Nov 2017 22:28)      INTERVAL HPI/OVERNIGHT EVENTS: offers no new complaints; current symptoms resolving. Patient breathing heavily.    MEDICATIONS  (STANDING):  ALBUTerol/ipratropium for Nebulization 3 milliLiter(s) Nebulizer every 6 hours  meropenem IVPB 500 milliGRAM(s) IV Intermittent every 12 hours  morphine Concentrate 5 milliGRAM(s) Oral every 4 hours  scopolamine   Patch 1.5 milliGRAM(s) Transdermal every 3 days    MEDICATIONS  (PRN):  glycopyrrolate Injectable 0.4 milliGRAM(s) IV Push every 6 hours PRN Secretions  LORazepam     Tablet 0.5 milliGRAM(s) Oral every 4 hours PRN Agitation  morphine  - Injectable 2 milliGRAM(s) IV Push every 1 hour PRN Dyspnea  morphine Concentrate 5 milliGRAM(s) Oral every 2 hours PRN Dyspnea      __________________________________________________  REVIEW OF SYSTEMS:  unable to obtain secondary to patient's mentation and medical condition    Vital Signs Last 24 Hrs  T(C): 36.3 (06 Nov 2017 15:22), Max: 36.6 (06 Nov 2017 07:25)  T(F): 97.4 (06 Nov 2017 15:22), Max: 97.8 (06 Nov 2017 07:25)  HR: 95 (06 Nov 2017 15:22) (83 - 108)  BP: 143/78 (06 Nov 2017 15:22) (143/78 - 151/61)  RR: 20 (06 Nov 2017 15:22) (20 - 20)  SpO2: 94% (06 Nov 2017 15:22) (92% - 94%)    ________________________________________________  PHYSICAL EXAM:  GENERAL: NAD  HEENT: Normocephalic;  conjunctivae and sclerae clear; moist mucous membranes;   NECK : supple  CHEST/LUNG: rales b/l  HEART: S1 S2  regular;  murmurs,   ABDOMEN: Soft, Nontender, Nondistended; Bowel sounds present  EXTREMITIES: no cyanosis; no edema; no calf tenderness  SKIN: warm and dry; no rash  NERVOUS SYSTEM:  drowsy. non responsive.    _________________________________________________  LABS: none today    RADIOLOGY & ADDITIONAL TESTS:    Imaging Personally Reviewed:  YES    Consultant(s) Notes Reviewed:   YES    Care Discussed with Consultants: YES    Plan of care was discussed with patient and /or primary care giver; all questions and concerns were addressed and care was aligned with patient's wishes.

## 2017-11-06 NOTE — PROGRESS NOTE ADULT - PROBLEM SELECTOR PLAN 6
palliative following, DNR/DNI now   not eating  candidate for inpatient vs home hospice  Plan is most likely for home hospice when arrangements made.  discussed with Dr. Gabriel,  HCP deciding between  inpatient vs home hospice, likely in 1-2 days

## 2017-11-06 NOTE — PROGRESS NOTE ADULT - SUBJECTIVE AND OBJECTIVE BOX
Patient no answering questions    ALBUTerol/ipratropium for Nebulization 3 milliLiter(s) Nebulizer every 6 hours  glycopyrrolate Injectable 0.4 milliGRAM(s) IV Push every 6 hours  LORazepam     Tablet 0.5 milliGRAM(s) Oral every 4 hours PRN  meropenem IVPB 500 milliGRAM(s) IV Intermittent every 12 hours  morphine  - Injectable 2 milliGRAM(s) IV Push every 3 hours PRN          Hemoglobin: 11.7 g/dL (11-04 @ 07:17)  Hemoglobin: 12.1 g/dL (11-03 @ 07:57)  Hemoglobin: 12.1 g/dL (11-02 @ 06:26)            Creatinine Trend: 1.55<--, 1.48<--, 1.49<--, 1.65<--, 1.81<--, 1.69<--    COAGS:           T(C): 36.6 (11-06-17 @ 07:25), Max: 36.6 (11-05-17 @ 16:12)  HR: 108 (11-06-17 @ 07:25) (82 - 108)  BP: 146/100 (11-06-17 @ 07:25) (140/72 - 151/61)  RR: 20 (11-06-17 @ 07:25) (20 - 20)  SpO2: 93% (11-06-17 @ 07:25) (92% - 93%)  Wt(kg): --    I&O's Summary        HEENT:   Normal oral mucosa, PERRL, EOMI	  Lymphatic: No lymphadenopathy , no edema  Cardiovascular: Normal S1 S2, No JVD, No murmurs , Peripheral pulses palpable 2+ bilaterally  Respiratory: Lungs clear to auscultation, normal effort 	  Gastrointestinal:  Soft, Non-tender, + BS	           DIAGNOSTIC TESTING:  [ ] Echocardiogram: < from: Transthoracic Echocardiogram (10.30.17 @ 07:20) >  CONCLUSIONS:  1. Normal mitral valve. Mild mitral regurgitation.  2. Calcified trileaflet aortic valve with decreased  opening. Adequate velocities were not obtained to estimate  severity of Aortic stenosis. Mild aortic insufficiency.  3. Aortic Root: 3.8 cm.  4. Severe left atrial enlargement.  5. Mild concentric left ventricular hypertrophy.  6. Dilated LV with severe global left ventricular  dysfunction.  7. Grade II diastolic dysfunction.  8. Severe right atrial enlargement.  9. Right ventricular enlargement with normal RV function.  10. RA Pressure is 15 mm Hg.  11. RV systolic pressure is severely increased at  76 mm  Hg.  12. There is moderate tricuspid regurgitation.  13. There is mild pulmonic regurgitation.  14. Normal pericardium with no pericardial effusion.    < end of copied text >    [ ]  Catheterization:  [ ] Stress Test:    OTHER: 	        ASSESSMENT/PLAN: 	89y Male minimally verbal, with PMH of CAD s/p triple bypass surgery. CHF on diuretics, afib not on AC bradycardia refused PPM in the past admitted with PNA found with an EF of 10-15%    Aspiration precaution   cont asa , supp  ABx per medicine   conservative management for CM,,   pt appears euvolemic on exam.   palliative f/u

## 2017-11-06 NOTE — PROGRESS NOTE ADULT - ASSESSMENT
1.	?Pneumonia vs CHF   ·	cont meropenem 500mgs IV q12h D8, needs 2 days more  ·	awaiting possible transfer to inpatient hospice, can dc antibiotics if switched over to hospice service

## 2017-11-06 NOTE — PROGRESS NOTE ADULT - ATTENDING COMMENTS
Patient seen and examined. Patient's history, vitals, labs, imaging studies reviewed. Discussed with above resident, agree with note with edits.   Dinorah Felix MD  11/7/2017 Patient seen and examined. Patient's history, vitals, labs, imaging studies reviewed. Discussed with above resident, agree with note with edits.   Dinorah Felix MD

## 2017-11-06 NOTE — PROGRESS NOTE ADULT - PROBLEM SELECTOR PLAN 6
heparin sub q palliative following, DNR/DNI now   not eating  candidate for inpatient vs home hospice  discussed with Dr. Gabriel, family wants to go to home hospice.

## 2017-11-06 NOTE — PROGRESS NOTE ADULT - SUBJECTIVE AND OBJECTIVE BOX
89y Male is under our care for possible pneumonia. Patient continues to do poorly and was found with congestive cough and constant mumbling. Family needs to decide on the transfer to inpatient hospice.  Has not had any fevers.    REVIEW OF SYSTEMS:  [ X ] Not able to illicit    MEDS:  meropenem IVPB 500 milliGRAM(s) IV Intermittent every 12 hours    ALLERGIES:No Known Allergies    VITALS:  Vital Signs Last 24 Hrs  T(C): 36.6 (06 Nov 2017 07:25), Max: 36.6 (05 Nov 2017 16:12)  T(F): 97.8 (06 Nov 2017 07:25), Max: 97.8 (05 Nov 2017 16:12)  HR: 108 (06 Nov 2017 07:25) (82 - 108)  BP: 146/100 (06 Nov 2017 07:25) (140/72 - 151/61)  BP(mean): --  RR: 20 (06 Nov 2017 07:25) (20 - 20)  SpO2: 93% (06 Nov 2017 07:25) (92% - 93%)      PHYSICAL EXAM:  HEENT: dry oral mucosa with poor dentation  Neck: stick arthritic neck no LNs  Respiratory: rales of bilateral mid lungs to bases  Cardiovascular: S1 S2 reg +loud systolic  Gastrointestinal: soft, nondistended abdominal with hypoactive BS; nontender  Extremities: Left arm edema  Skin: no rashes  Ortho: n/a  Neuro: awake but confused    LABS/DIAGNOSTIC TESTS:  No new labs      CULTURES:   .Blood Blood-Peripheral  10-30 @ 17:52   No growth at 5 days.  --  --      .Urine Catheterized  10-29 @ 12:23   No growth  --  --      .Blood Blood-Peripheral  10-29 @ 12:16   Growth in aerobic bottle: Coag Negative Staphylococcus  Single set isolate, possible contaminant. Contact      RADIOLOGY:  no new studies

## 2017-11-06 NOTE — PROGRESS NOTE ADULT - PROBLEM SELECTOR PLAN 2
Secondary to CHF, Start Roxanol around the clock, with PRN Roxanol and PRN IV morphine, if symptoms are not well controlled

## 2017-11-06 NOTE — PROGRESS NOTE ADULT - PROBLEM SELECTOR PLAN 4
Family wants to take him home with hospice.  HCP Ignacio Henry is working on consents.  Patient may need inpatient hospice if oral medications are not managing his symptoms.  Patient is DNR/DNI

## 2017-11-06 NOTE — PROGRESS NOTE ADULT - PROBLEM SELECTOR PLAN 7
palliative following, DNR/DNI now   not eating  candidate for inpatient vs home hospice  discussed with Dr. Gabriel, family wants to go to home hospice.

## 2017-11-06 NOTE — PROGRESS NOTE ADULT - PROBLEM SELECTOR PLAN 1
WITH STAPH BACTEREMIA  repeat blood cultures - no growth  c/w meropenem day no 8 per ID  cxr shows worsening pneumonia

## 2017-11-06 NOTE — PROGRESS NOTE ADULT - SUBJECTIVE AND OBJECTIVE BOX
OVERNIGHT EVENTS: Dyspnea, Congestion    Review of Systems: Unable to obtain due to poor mentation    MEDICATIONS  (STANDING):  ALBUTerol/ipratropium for Nebulization 3 milliLiter(s) Nebulizer every 6 hours  meropenem IVPB 500 milliGRAM(s) IV Intermittent every 12 hours  morphine Concentrate 5 milliGRAM(s) Oral every 4 hours  scopolamine   Patch 1.5 milliGRAM(s) Transdermal every 3 days    MEDICATIONS  (PRN):  glycopyrrolate Injectable 0.4 milliGRAM(s) IV Push every 6 hours PRN Secretions  LORazepam     Tablet 0.5 milliGRAM(s) Oral every 4 hours PRN Agitation  morphine  - Injectable 2 milliGRAM(s) IV Push every 1 hour PRN Dyspnea  morphine Concentrate 5 milliGRAM(s) Oral every 2 hours PRN Dyspnea    PHYSICAL EXAM:  Vital Signs Last 24 Hrs  T(C): 36.3 (06 Nov 2017 15:22), Max: 36.6 (05 Nov 2017 16:12)  T(F): 97.4 (06 Nov 2017 15:22), Max: 97.8 (05 Nov 2017 16:12)  HR: 95 (06 Nov 2017 15:22) (82 - 108)  BP: 143/78 (06 Nov 2017 15:22) (140/72 - 151/61)  RR: 20 (06 Nov 2017 15:22) (20 - 20)  SpO2: 94% (06 Nov 2017 15:22) (92% - 94%)  General: alert  oriented x _0___    lethargi,c distressed, cachexia,  nonverbal, temporal wasting  Karnofsky Performance Score/Palliative Performance Status Version2:   20 %    HEENT: dry mouth    Lungs:  tachypnea/labored breathing,  excessive secretions  CV: normal    GI: Incontinent  : normal  incontinent  oliguria/anuria  melchor  Musculoskeletal: normal  weakness  edema             ambulatory  bedbound/wheelchair bound  Skin: normal  pressure ulcers: stage: edema: other:  Neuro: no deficits cognitive impairment dsyphagia/dysarthria paresis: other:  Oral intake ability: unable/only mouth care [minimal moderate full capability]  Diet: NPO,     LABS:                RADIOLOGY & ADDITIONAL STUDIES:    ADVANCE DIRECTIVES:  Advanced Care Planning discussion total time spent: OVERNIGHT EVENTS: Dyspnea, Congestion    Review of Systems: Unable to obtain due to poor mentation    MEDICATIONS  (STANDING):  ALBUTerol/ipratropium for Nebulization 3 milliLiter(s) Nebulizer every 6 hours  meropenem IVPB 500 milliGRAM(s) IV Intermittent every 12 hours  morphine Concentrate 5 milliGRAM(s) Oral every 4 hours  scopolamine   Patch 1.5 milliGRAM(s) Transdermal every 3 days    MEDICATIONS  (PRN):  glycopyrrolate Injectable 0.4 milliGRAM(s) IV Push every 6 hours PRN Secretions  LORazepam     Tablet 0.5 milliGRAM(s) Oral every 4 hours PRN Agitation  morphine  - Injectable 2 milliGRAM(s) IV Push every 1 hour PRN Dyspnea  morphine Concentrate 5 milliGRAM(s) Oral every 2 hours PRN Dyspnea    PHYSICAL EXAM:  Vital Signs Last 24 Hrs  T(C): 36.3 (06 Nov 2017 15:22), Max: 36.6 (05 Nov 2017 16:12)  T(F): 97.4 (06 Nov 2017 15:22), Max: 97.8 (05 Nov 2017 16:12)  HR: 95 (06 Nov 2017 15:22) (82 - 108)  BP: 143/78 (06 Nov 2017 15:22) (140/72 - 151/61)  RR: 20 (06 Nov 2017 15:22) (20 - 20)  SpO2: 94% (06 Nov 2017 15:22) (92% - 94%)  General: alert  oriented x _0___    lethargi,c distressed, cachexia,  nonverbal, temporal wasting  Karnofsky Performance Score/Palliative Performance Status Version2:   20 %    HEENT: dry mouth    Lungs:  tachypnea/labored breathing, excessive secretions  CV: normal    GI: Incontinent  : Incontinent  Musculoskeletal: Weakness, Bedbound  Skin: Normal  Neuro: Cognitive impairment   Oral intake ability: Minimal  Diet: Dysphagia    ADVANCE DIRECTIVES: DNR/DNI

## 2017-11-06 NOTE — PROGRESS NOTE ADULT - SUBJECTIVE AND OBJECTIVE BOX
Patient is a 89 year old  Male who presents with a chief complaint of lethargy (04 Nov 2017 22:28)      INTERVAL HPI/OVERNIGHT EVENTS: No overnight events.    MEDICATIONS  (STANDING):  ALBUTerol/ipratropium for Nebulization 3 milliLiter(s) Nebulizer every 6 hours  meropenem IVPB 500 milliGRAM(s) IV Intermittent every 12 hours  morphine Concentrate 5 milliGRAM(s) Oral every 4 hours  scopolamine   Patch 1.5 milliGRAM(s) Transdermal every 3 days    MEDICATIONS  (PRN):  glycopyrrolate Injectable 0.4 milliGRAM(s) IV Push every 6 hours PRN Secretions  LORazepam     Tablet 0.5 milliGRAM(s) Oral every 4 hours PRN Agitation  morphine  - Injectable 2 milliGRAM(s) IV Push every 1 hour PRN Dyspnea  morphine Concentrate 5 milliGRAM(s) Oral every 2 hours PRN Dyspnea        Allergies    No Known Allergies      REVIEW OF SYSTEMS: unable to obtain secondary to patient's mentation and medical condition      PHYSICAL EXAM:    T(C): 36.3 (11-06-17 @ 15:22), Max: 36.6 (11-06-17 @ 07:25)  HR: 95 (11-06-17 @ 15:22) (83 - 108)  BP: 143/78 (11-06-17 @ 15:22) (143/78 - 151/61)  RR: 20 (11-06-17 @ 15:22) (20 - 20)  SpO2: 94% (11-06-17 @ 15:22) (92% - 94%)      GENERAL: Elderly male, NAD, comfortable  HEAD:  Atraumatic, Normocephalic  EYES: EOMI, conjunctiva and sclera clear  ENMT: Moist mucous membranes  NECK: Supple,  CHEST/LUNG: b/l rhonchi,  HEART: Regular rate and rhythm; decreased sounds, murmurs  ABDOMEN: Soft, Nontender, Nondistended; Bowel sounds present  EXTREMITIES:  2+ Peripheral Pulses, No clubbing, cyanosis, +edema, weakness x4, bedbound  LYMPH: No lymphadenopathy noted  SKIN: No rashes   Neuro: severe cognitive impairment      LABS:  none today                       11.7   7.5   )-----------( 127      ( 04 Nov 2017 07:17 )             37.7     11-04    146<H>  |  111<H>  |  26<H>  ----------------------------<  84  3.9   |  30  |  1.55<H>    Ca    9.1      04 Nov 2017 07:17  Phos  3.0     11-04  Mg     2.7     11-04    TPro  5.8<L>  /  Alb  2.4<L>  /  TBili  0.6  /  DBili  x   /  AST  18  /  ALT  19  /  AlkPhos  69  11-04      RADIOLOGY & ADDITIONAL TESTS:    Imaging Personally Reviewed:  [x ] YES  [ ] NO    Consultant(s) Notes Reviewed:  [x ] YES  [ ] NO

## 2017-11-07 RX ORDER — FENTANYL CITRATE 50 UG/ML
1 INJECTION INTRAVENOUS
Qty: 0 | Refills: 0 | Status: DISCONTINUED | OUTPATIENT
Start: 2017-11-07 | End: 2017-11-10

## 2017-11-07 RX ORDER — IMIPENEM AND CILASTATIN 250; 250 MG/100ML; MG/100ML
500 INJECTION, POWDER, FOR SOLUTION INTRAVENOUS EVERY 12 HOURS
Qty: 0 | Refills: 0 | Status: DISCONTINUED | OUTPATIENT
Start: 2017-11-07 | End: 2017-11-08

## 2017-11-07 RX ADMIN — Medication 3 MILLILITER(S): at 08:31

## 2017-11-07 RX ADMIN — MORPHINE SULFATE 2 MILLIGRAM(S): 50 CAPSULE, EXTENDED RELEASE ORAL at 03:47

## 2017-11-07 RX ADMIN — MORPHINE SULFATE 5 MILLIGRAM(S): 50 CAPSULE, EXTENDED RELEASE ORAL at 10:12

## 2017-11-07 RX ADMIN — MORPHINE SULFATE 2 MILLIGRAM(S): 50 CAPSULE, EXTENDED RELEASE ORAL at 03:51

## 2017-11-07 RX ADMIN — ROBINUL 0.4 MILLIGRAM(S): 0.2 INJECTION INTRAMUSCULAR; INTRAVENOUS at 10:53

## 2017-11-07 RX ADMIN — MORPHINE SULFATE 5 MILLIGRAM(S): 50 CAPSULE, EXTENDED RELEASE ORAL at 03:17

## 2017-11-07 RX ADMIN — MEROPENEM 200 MILLIGRAM(S): 1 INJECTION INTRAVENOUS at 05:59

## 2017-11-07 RX ADMIN — MORPHINE SULFATE 5 MILLIGRAM(S): 50 CAPSULE, EXTENDED RELEASE ORAL at 05:59

## 2017-11-07 RX ADMIN — FENTANYL CITRATE 1 PATCH: 50 INJECTION INTRAVENOUS at 11:01

## 2017-11-07 RX ADMIN — IMIPENEM AND CILASTATIN 100 MILLIGRAM(S): 250; 250 INJECTION, POWDER, FOR SOLUTION INTRAVENOUS at 18:31

## 2017-11-07 RX ADMIN — MORPHINE SULFATE 2 MILLIGRAM(S): 50 CAPSULE, EXTENDED RELEASE ORAL at 21:55

## 2017-11-07 RX ADMIN — MORPHINE SULFATE 2 MILLIGRAM(S): 50 CAPSULE, EXTENDED RELEASE ORAL at 21:37

## 2017-11-07 RX ADMIN — Medication 3 MILLILITER(S): at 15:33

## 2017-11-07 RX ADMIN — MORPHINE SULFATE 5 MILLIGRAM(S): 50 CAPSULE, EXTENDED RELEASE ORAL at 03:31

## 2017-11-07 RX ADMIN — Medication 3 MILLILITER(S): at 21:24

## 2017-11-07 RX ADMIN — ROBINUL 0.4 MILLIGRAM(S): 0.2 INJECTION INTRAMUSCULAR; INTRAVENOUS at 03:17

## 2017-11-07 RX ADMIN — MORPHINE SULFATE 5 MILLIGRAM(S): 50 CAPSULE, EXTENDED RELEASE ORAL at 10:10

## 2017-11-07 RX ADMIN — ROBINUL 0.4 MILLIGRAM(S): 0.2 INJECTION INTRAMUSCULAR; INTRAVENOUS at 18:44

## 2017-11-07 NOTE — PROGRESS NOTE ADULT - SUBJECTIVE AND OBJECTIVE BOX
PGY 1 Note discussed with supervising resident and primary attending    Patient is a 89y old  Male who presents with a chief complaint of lethargy (04 Nov 2017 22:28)      INTERVAL HPI/OVERNIGHT EVENTS: Patient was seen and examined at bed side. Patient is non verbal. Breathing was improved today.     MEDICATIONS  (STANDING):  ALBUTerol/ipratropium for Nebulization 3 milliLiter(s) Nebulizer every 6 hours  fentaNYL   Patch  12 MICROgram(s)/Hr 1 Patch Transdermal every 72 hours  imipenem/cilastatin  IVPB 500 milliGRAM(s) IV Intermittent every 12 hours  scopolamine   Patch 1.5 milliGRAM(s) Transdermal every 3 days    MEDICATIONS  (PRN):  glycopyrrolate Injectable 0.4 milliGRAM(s) IV Push every 6 hours PRN Secretions  LORazepam    Concentrate 0.5 milliGRAM(s) Oral every 4 hours PRN Agitation  morphine  - Injectable 2 milliGRAM(s) IV Push every 1 hour PRN Dyspnea  morphine Concentrate 5 milliGRAM(s) Oral every 2 hours PRN Dyspnea      __________________________________________________  REVIEW OF SYSTEMS:    couldn't assess due to patient being non verbal. But is afebrile. breathing improved today.         Vital Signs Last 24 Hrs  T(C): 36.5 (07 Nov 2017 15:31), Max: 36.5 (07 Nov 2017 15:31)  T(F): 97.7 (07 Nov 2017 15:31), Max: 97.7 (07 Nov 2017 15:31)  HR: 101 (07 Nov 2017 15:31) (85 - 101)  BP: 153/82 (07 Nov 2017 15:31) (133/74 - 153/82)  BP(mean): --  RR: 20 (07 Nov 2017 15:31) (18 - 20)  SpO2: 100% (07 Nov 2017 15:31) (93% - 100%)    ________________________________________________  PHYSICAL EXAM:  GENERAL: NAD  HEENT: Normocephalic;  conjunctivae and sclerae clear; moist mucous membranes;   CHEST/LUNG: Clear to auscultation bilaterally with good air entry   HEART: S1 S2  regular; no murmurs, gallops or rubs  ABDOMEN: Soft, Nontender, Nondistended; Bowel sounds present  EXTREMITIES: no cyanosis; no edema; no calf tenderness  SKIN: warm and dry; no rash  NERVOUS SYSTEM:  drowsy     _________________________________________________  LABS:              CAPILLARY BLOOD GLUCOSE            RADIOLOGY & ADDITIONAL TESTS:    Imaging Personally Reviewed:  YES    Consultant(s) Notes Reviewed:   YES    Care Discussed with Consultants :     Plan of care was discussed with patient and /or primary care giver; all questions and concerns were addressed and care was aligned with patient's wishes. PGY 1 Note discussed with supervising resident and primary attending    Patient is a 89y old  Male who presents with a chief complaint of lethargy (04 Nov 2017 22:28)      INTERVAL HPI/OVERNIGHT EVENTS: Patient was seen and examined at bed side. Patient is non verbal. Breathing was improved today.     MEDICATIONS  (STANDING):  ALBUTerol/ipratropium for Nebulization 3 milliLiter(s) Nebulizer every 6 hours  fentaNYL   Patch  12 MICROgram(s)/Hr 1 Patch Transdermal every 72 hours  imipenem/cilastatin  IVPB 500 milliGRAM(s) IV Intermittent every 12 hours  scopolamine   Patch 1.5 milliGRAM(s) Transdermal every 3 days    MEDICATIONS  (PRN):  glycopyrrolate Injectable 0.4 milliGRAM(s) IV Push every 6 hours PRN Secretions  LORazepam    Concentrate 0.5 milliGRAM(s) Oral every 4 hours PRN Agitation  morphine  - Injectable 2 milliGRAM(s) IV Push every 1 hour PRN Dyspnea  morphine Concentrate 5 milliGRAM(s) Oral every 2 hours PRN Dyspnea      __________________________________________________  REVIEW OF SYSTEMS: unable to elicit secondary to patient's mentation and medical condition, couldn't assess due to patient being non verbal. But is afebrile.     Vital Signs Last 24 Hrs  T(C): 36.5 (07 Nov 2017 15:31), Max: 36.5 (07 Nov 2017 15:31)  T(F): 97.7 (07 Nov 2017 15:31), Max: 97.7 (07 Nov 2017 15:31)  HR: 101 (07 Nov 2017 15:31) (85 - 101)  BP: 153/82 (07 Nov 2017 15:31) (133/74 - 153/82)  RR: 20 (07 Nov 2017 15:31) (18 - 20)  SpO2: 100% (07 Nov 2017 15:31) (93% - 100%)    ________________________________________________  PHYSICAL EXAM:  GENERAL: NAD  HEENT: Normocephalic;  conjunctivae and sclerae clear; moist mucous membranes;   CHEST/LUNG: Clear to auscultation bilaterally with good air entry   HEART: S1 S2  regular; no murmurs, gallops or rubs  ABDOMEN: Soft, Nontender, Nondistended; Bowel sounds present  EXTREMITIES: no cyanosis; no edema; no calf tenderness  SKIN: warm and dry; no rash  NERVOUS SYSTEM:  drowsy     _________________________________________________  LABS: none today      RADIOLOGY & ADDITIONAL TESTS:    Imaging Personally Reviewed:  YES    Consultant(s) Notes Reviewed:   YES    Care Discussed with Consultants: YES     Plan of care was discussed with patient and /or primary care giver; all questions and concerns were addressed and care was aligned with patient's wishes.

## 2017-11-07 NOTE — PROGRESS NOTE ADULT - PROBLEM SELECTOR PROBLEM 7
Goals of care, counseling/discussion

## 2017-11-07 NOTE — PROGRESS NOTE ADULT - SUBJECTIVE AND OBJECTIVE BOX
Patient not answering questions    ALBUTerol/ipratropium for Nebulization 3 milliLiter(s) Nebulizer every 6 hours  fentaNYL   Patch  12 MICROgram(s)/Hr 1 Patch Transdermal every 72 hours  glycopyrrolate Injectable 0.4 milliGRAM(s) IV Push every 6 hours PRN  imipenem/cilastatin  IVPB 500 milliGRAM(s) IV Intermittent every 12 hours  LORazepam    Concentrate 0.5 milliGRAM(s) Oral every 4 hours PRN  morphine  - Injectable 2 milliGRAM(s) IV Push every 1 hour PRN  morphine Concentrate 5 milliGRAM(s) Oral every 2 hours PRN  scopolamine   Patch 1.5 milliGRAM(s) Transdermal every 3 days          Hemoglobin: 11.7 g/dL (11-04 @ 07:17)  Hemoglobin: 12.1 g/dL (11-03 @ 07:57)            Creatinine Trend: 1.55<--, 1.48<--, 1.49<--, 1.65<--, 1.81<--, 1.69<--    COAGS:           T(C): 36.5 (11-07-17 @ 15:31), Max: 36.5 (11-07-17 @ 15:31)  HR: 101 (11-07-17 @ 15:31) (85 - 101)  BP: 153/82 (11-07-17 @ 15:31) (133/74 - 153/82)  RR: 20 (11-07-17 @ 15:31) (18 - 20)  SpO2: 100% (11-07-17 @ 15:31) (93% - 100%)  Wt(kg): --    I&O's Summary        HEENT:   Normal oral mucosa, PERRL, EOMI	  Lymphatic: No lymphadenopathy , no edema  Cardiovascular: Normal S1 S2, No JVD, No murmurs , Peripheral pulses palpable 2+ bilaterally  Respiratory: Lungs clear to auscultation, normal effort 	  Gastrointestinal:  Soft, Non-tender, + BS	           DIAGNOSTIC TESTING:  [ ] Echocardiogram: < from: Transthoracic Echocardiogram (10.30.17 @ 07:20) >  CONCLUSIONS:  1. Normal mitral valve. Mild mitral regurgitation.  2. Calcified trileaflet aortic valve with decreased  opening. Adequate velocities were not obtained to estimate  severity of Aortic stenosis. Mild aortic insufficiency.  3. Aortic Root: 3.8 cm.  4. Severe left atrial enlargement.  5. Mild concentric left ventricular hypertrophy.  6. Dilated LV with severe global left ventricular  dysfunction.  7. Grade II diastolic dysfunction.  8. Severe right atrial enlargement.  9. Right ventricular enlargement with normal RV function.  10. RA Pressure is 15 mm Hg.  11. RV systolic pressure is severely increased at  76 mm  Hg.  12. There is moderate tricuspid regurgitation.  13. There is mild pulmonic regurgitation.  14. Normal pericardium with no pericardial effusion.    < end of copied text >    [ ]  Catheterization:  [ ] Stress Test:    OTHER: 	        ASSESSMENT/PLAN: 	89y Male minimally verbal, with PMH of CAD s/p triple bypass surgery. CHF on diuretics, afib not on AC bradycardia refused PPM in the past admitted with PNA found with an EF of 10-15%    Aspiration precaution   cont asa , supp  ABx per medicine   conservative management for CM,, .   palliative f/u      Mani Reddy MD, FACC  [facility], [facility]  [address] [place] [address]  PHONE:  [phone]  BEEPER : [phone]

## 2017-11-07 NOTE — PROGRESS NOTE ADULT - PROBLEM SELECTOR PLAN 6
heparin sub q palliative following, DNR/DNI now   not eating  candidate for inpatient vs home hospice  family wants to take patient to Home hospice. waiting for consent form to be returned to .  Discussed with palliative team. Comfort care continues

## 2017-11-07 NOTE — PROGRESS NOTE ADULT - PROBLEM SELECTOR PLAN 3
sodium 146 today.   Dr Peters on board for nephrology evaluation  nephrology follow up sodium improved  nephrology follow up

## 2017-11-07 NOTE — PROGRESS NOTE ADULT - SUBJECTIVE AND OBJECTIVE BOX
89y Male is under our care for possible pneumonia. Patient remains in very poor condition.  Patient continues to just mumbling and has increased gurgling noises today.  Has not had any fevers. Comfort care continues.    REVIEW OF SYSTEMS:  [ X ] Not able to illicit    MEDS:  meropenem IVPB 500 milliGRAM(s) IV Intermittent every 12 hours    ALLERGIES:No Known Allergies    VITALS:  Vital Signs Last 24 Hrs  T(C): 36.3 (07 Nov 2017 07:36), Max: 36.4 (06 Nov 2017 23:49)  T(F): 97.3 (07 Nov 2017 07:36), Max: 97.6 (06 Nov 2017 23:49)  HR: 89 (07 Nov 2017 07:36) (85 - 95)  BP: 146/90 (07 Nov 2017 07:36) (133/74 - 146/90)  BP(mean): --  RR: 18 (07 Nov 2017 07:36) (18 - 20)  SpO2: 98% (07 Nov 2017 07:36) (93% - 98%)      PHYSICAL EXAM:  HEENT: dry oral mucosa with poor dentation  Neck: n/a  Respiratory: bilateral coarse rhonchi with diffuse rales  Cardiovascular: not able to assess 2/2 coarse breathe sounds  Gastrointestinal: soft, nondistended abdominal with hypoactive BS; nontender  Extremities: Left arm edema  Skin: no rashes  Ortho: n/a  Neuro: confused    LABS/DIAGNOSTIC TESTS:  No new labs      CULTURES:   .Blood Blood-Peripheral  10-30 @ 17:52   No growth at 5 days.  --  --      .Urine Catheterized  10-29 @ 12:23   No growth  --  --      .Blood Blood-Peripheral  10-29 @ 12:16   Growth in aerobic bottle: Coag Negative Staphylococcus  Single set isolate, possible contaminant. Contact      RADIOLOGY:  no new studies

## 2017-11-07 NOTE — PROGRESS NOTE ADULT - PROBLEM SELECTOR PLAN 1
WITH STAPH BACTEREMIA  repeat blood cultures - no growth  meropenem switched to Pramaxin as per Dr Clancy.

## 2017-11-07 NOTE — PROGRESS NOTE ADULT - ASSESSMENT
1.	?Pneumonia vs CHF   ·	cont meropenem 500mgs IV q12h D8, needs 1 more day of tx  ·	awaiting possible transfer to inpatient hospice

## 2017-11-07 NOTE — PROGRESS NOTE ADULT - ATTENDING COMMENTS
Patient seen and examined. Patient's history, vitals, labs, imaging studies reviewed. Discussed with above resident, agree with note with edits.   Dinorah Felix MD  11/7/2017

## 2017-11-07 NOTE — PROGRESS NOTE ADULT - PROBLEM SELECTOR PLAN 7
palliative following, DNR/DNI now   not eating  candidate for inpatient vs home hospice  family wants to take patient to Home hospice. waiting for consent form to be returned to .  Discussed with palliative team. Comfort care continues

## 2017-11-07 NOTE — PROGRESS NOTE ADULT - PROBLEM SELECTOR PLAN 2
No rhonchi appreciated  PNA likely from community acquired vs aspiration  and possible underlying acute on chronic systolic chf.  cxr showed possible bilateral lower lobe infiltrate vs pulmonary edema  echo ef of 15 percent  Cardiology consulted no intervention at this time  c/w watchful monitoring

## 2017-11-08 LAB
ANION GAP SERPL CALC-SCNC: 8 MMOL/L — SIGNIFICANT CHANGE UP (ref 5–17)
BUN SERPL-MCNC: 47 MG/DL — HIGH (ref 7–18)
CALCIUM SERPL-MCNC: 9.5 MG/DL — SIGNIFICANT CHANGE UP (ref 8.4–10.5)
CHLORIDE SERPL-SCNC: 111 MMOL/L — HIGH (ref 96–108)
CO2 SERPL-SCNC: 30 MMOL/L — SIGNIFICANT CHANGE UP (ref 22–31)
CREAT SERPL-MCNC: 2.19 MG/DL — HIGH (ref 0.5–1.3)
GLUCOSE SERPL-MCNC: 99 MG/DL — SIGNIFICANT CHANGE UP (ref 70–99)
HCT VFR BLD CALC: 44 % — SIGNIFICANT CHANGE UP (ref 39–50)
HGB BLD-MCNC: 13.1 G/DL — SIGNIFICANT CHANGE UP (ref 13–17)
MAGNESIUM SERPL-MCNC: 3 MG/DL — HIGH (ref 1.6–2.6)
MCHC RBC-ENTMCNC: 29.8 GM/DL — LOW (ref 32–36)
MCHC RBC-ENTMCNC: 32.2 PG — SIGNIFICANT CHANGE UP (ref 27–34)
MCV RBC AUTO: 107.9 FL — HIGH (ref 80–100)
PHOSPHATE SERPL-MCNC: 6.5 MG/DL — HIGH (ref 2.5–4.5)
PLATELET # BLD AUTO: 145 K/UL — LOW (ref 150–400)
POTASSIUM SERPL-MCNC: 5.2 MMOL/L — SIGNIFICANT CHANGE UP (ref 3.5–5.3)
POTASSIUM SERPL-SCNC: 5.2 MMOL/L — SIGNIFICANT CHANGE UP (ref 3.5–5.3)
RBC # BLD: 4.08 M/UL — LOW (ref 4.2–5.8)
RBC # FLD: 17.4 % — HIGH (ref 10.3–14.5)
SODIUM SERPL-SCNC: 149 MMOL/L — HIGH (ref 135–145)
WBC # BLD: 13.6 K/UL — HIGH (ref 3.8–10.5)
WBC # FLD AUTO: 13.6 K/UL — HIGH (ref 3.8–10.5)

## 2017-11-08 RX ORDER — SODIUM CHLORIDE 9 MG/ML
1000 INJECTION, SOLUTION INTRAVENOUS
Qty: 0 | Refills: 0 | Status: DISCONTINUED | OUTPATIENT
Start: 2017-11-08 | End: 2017-11-08

## 2017-11-08 RX ORDER — IMIPENEM AND CILASTATIN 250; 250 MG/100ML; MG/100ML
500 INJECTION, POWDER, FOR SOLUTION INTRAVENOUS EVERY 12 HOURS
Qty: 0 | Refills: 0 | Status: COMPLETED | OUTPATIENT
Start: 2017-11-08 | End: 2017-11-09

## 2017-11-08 RX ADMIN — Medication 3 MILLILITER(S): at 09:41

## 2017-11-08 RX ADMIN — MORPHINE SULFATE 2 MILLIGRAM(S): 50 CAPSULE, EXTENDED RELEASE ORAL at 01:47

## 2017-11-08 RX ADMIN — MORPHINE SULFATE 2 MILLIGRAM(S): 50 CAPSULE, EXTENDED RELEASE ORAL at 15:04

## 2017-11-08 RX ADMIN — Medication 3 MILLILITER(S): at 02:22

## 2017-11-08 RX ADMIN — Medication 3 MILLILITER(S): at 20:56

## 2017-11-08 RX ADMIN — MORPHINE SULFATE 2 MILLIGRAM(S): 50 CAPSULE, EXTENDED RELEASE ORAL at 02:10

## 2017-11-08 RX ADMIN — ROBINUL 0.4 MILLIGRAM(S): 0.2 INJECTION INTRAMUSCULAR; INTRAVENOUS at 01:47

## 2017-11-08 RX ADMIN — IMIPENEM AND CILASTATIN 100 MILLIGRAM(S): 250; 250 INJECTION, POWDER, FOR SOLUTION INTRAVENOUS at 06:43

## 2017-11-08 RX ADMIN — MORPHINE SULFATE 2 MILLIGRAM(S): 50 CAPSULE, EXTENDED RELEASE ORAL at 11:03

## 2017-11-08 RX ADMIN — MORPHINE SULFATE 2 MILLIGRAM(S): 50 CAPSULE, EXTENDED RELEASE ORAL at 12:05

## 2017-11-08 RX ADMIN — MORPHINE SULFATE 2 MILLIGRAM(S): 50 CAPSULE, EXTENDED RELEASE ORAL at 13:52

## 2017-11-08 RX ADMIN — Medication 3 MILLILITER(S): at 15:11

## 2017-11-08 RX ADMIN — IMIPENEM AND CILASTATIN 100 MILLIGRAM(S): 250; 250 INJECTION, POWDER, FOR SOLUTION INTRAVENOUS at 18:21

## 2017-11-08 NOTE — PROGRESS NOTE ADULT - PROBLEM SELECTOR PLAN 2
b/l ronchi  PNA likely from community acquired vs aspiration  and possible underlying acute on chronic systolic chf.  cxr shows possible bilateral lower lobe infiltrate vs pulmonary edema  echo ef of 15 percent  Cardiology consulted no intervention at this time  c/w watchful monitoring No rhonchi appreciated  PNA likely from community acquired vs aspiration  and possible underlying acute on chronic systolic chf.  cxr shows possible bilateral lower lobe infiltrate vs pulmonary edema  echo ef of 15 percent  Cardiology consulted no intervention at this time  c/w watchful monitoring

## 2017-11-08 NOTE — PROGRESS NOTE ADULT - SUBJECTIVE AND OBJECTIVE BOX
PGY 1 Note discussed with supervising resident and primary attending    Patient is a 89y old  Male who presents with a chief complaint of lethargy (04 Nov 2017 22:28)      INTERVAL HPI/OVERNIGHT EVENTS: Patient was seen and examined at bed side. still non responsive.     MEDICATIONS  (STANDING):  ALBUTerol/ipratropium for Nebulization 3 milliLiter(s) Nebulizer every 6 hours  fentaNYL   Patch  12 MICROgram(s)/Hr 1 Patch Transdermal every 72 hours  imipenem/cilastatin  IVPB 500 milliGRAM(s) IV Intermittent every 12 hours  scopolamine   Patch 1.5 milliGRAM(s) Transdermal every 3 days    MEDICATIONS  (PRN):  glycopyrrolate Injectable 0.4 milliGRAM(s) IV Push every 6 hours PRN Secretions  LORazepam    Concentrate 0.5 milliGRAM(s) Oral every 4 hours PRN Agitation  morphine  - Injectable 2 milliGRAM(s) IV Push every 1 hour PRN Dyspnea  morphine Concentrate 5 milliGRAM(s) Oral every 2 hours PRN Dyspnea      __________________________________________________  REVIEW OF SYSTEMS:   unable to elicit secondary to patient's mentation and medical condition, couldn't assess due to patient being non verbal. But is afebrile.     Vital Signs Last 24 Hrs  T(C): 36.5 (08 Nov 2017 08:27), Max: 36.6 (08 Nov 2017 00:00)  T(F): 97.7 (08 Nov 2017 08:27), Max: 97.8 (08 Nov 2017 00:00)  HR: 101 (08 Nov 2017 08:27) (100 - 101)  BP: 93/37 (08 Nov 2017 08:27) (93/37 - 153/82)  BP(mean): --  RR: 17 (08 Nov 2017 08:27) (17 - 20)  SpO2: 99% (08 Nov 2017 08:27) (99% - 100%)    ________________________________________________  PHYSICAL EXAM:  GENERAL: NAD  HEENT: Normocephalic;  conjunctivae and sclerae clear; moist mucous membranes;   CHEST/LUNG: Clear to auscultation bilaterally with good air entry   HEART: S1 S2  regular; no murmurs, gallops or rubs  ABDOMEN: Soft, Nontender, Nondistended; Bowel sounds present  EXTREMITIES: no cyanosis; no edema; no calf tenderness  SKIN: warm and dry; no rash  NERVOUS SYSTEM:  drowsy     _________________________________________________  LABS:                        13.1   13.6  )-----------( 145      ( 08 Nov 2017 06:34 )             44.0     11-08    149<H>  |  111<H>  |  47<H>  ----------------------------<  99  5.2   |  30  |  2.19<H>    Ca    9.5      08 Nov 2017 06:34  Phos  6.5     11-08  Mg     3.0     11-08          CAPILLARY BLOOD GLUCOSE            RADIOLOGY & ADDITIONAL TESTS:    Imaging Personally Reviewed:  YES    Consultant(s) Notes Reviewed:   YES  Care Discussed with Consultants :     Plan of care was discussed with patient and /or primary care giver; all questions and concerns were addressed and care was aligned with patient's wishes. PGY 1 Note discussed with supervising resident and primary attending    Patient is a 89y old  Male who presents with a chief complaint of lethargy (04 Nov 2017 22:28)      INTERVAL HPI/OVERNIGHT EVENTS: Patient was seen and examined at bed side. still non verbal.     MEDICATIONS  (STANDING):  ALBUTerol/ipratropium for Nebulization 3 milliLiter(s) Nebulizer every 6 hours  fentaNYL   Patch  12 MICROgram(s)/Hr 1 Patch Transdermal every 72 hours  imipenem/cilastatin  IVPB 500 milliGRAM(s) IV Intermittent every 12 hours  scopolamine   Patch 1.5 milliGRAM(s) Transdermal every 3 days    MEDICATIONS  (PRN):  glycopyrrolate Injectable 0.4 milliGRAM(s) IV Push every 6 hours PRN Secretions  LORazepam    Concentrate 0.5 milliGRAM(s) Oral every 4 hours PRN Agitation  morphine  - Injectable 2 milliGRAM(s) IV Push every 1 hour PRN Dyspnea  morphine Concentrate 5 milliGRAM(s) Oral every 2 hours PRN Dyspnea      __________________________________________________  REVIEW OF SYSTEMS:   unable to elicit secondary to patient's mentation and medical condition, couldn't assess due to patient being non verbal. But is afebrile.     Vital Signs Last 24 Hrs  T(C): 36.5 (08 Nov 2017 08:27), Max: 36.6 (08 Nov 2017 00:00)  T(F): 97.7 (08 Nov 2017 08:27), Max: 97.8 (08 Nov 2017 00:00)  HR: 101 (08 Nov 2017 08:27) (100 - 101)  BP: 93/37 (08 Nov 2017 08:27) (93/37 - 153/82)  BP(mean): --  RR: 17 (08 Nov 2017 08:27) (17 - 20)  SpO2: 99% (08 Nov 2017 08:27) (99% - 100%)    ________________________________________________  PHYSICAL EXAM:  GENERAL: NAD  HEENT: Normocephalic;  conjunctivae and sclerae clear; moist mucous membranes;   CHEST/LUNG: b/l rales  HEART: S1 S2  regular; murmurs  ABDOMEN: Soft, Nontender, Nondistended; Bowel sounds present  EXTREMITIES: no cyanosis; trace edema; no calf tenderness  SKIN: warm and dry; no rash  NERVOUS SYSTEM:  drowsy     _________________________________________________  LABS:                        13.1   13.6  )-----------( 145      ( 08 Nov 2017 06:34 )             44.0     11-08    149<H>  |  111<H>  |  47<H>  ----------------------------<  99  5.2   |  30  |  2.19<H>    Ca    9.5      08 Nov 2017 06:34  Phos  6.5     11-08  Mg     3.0     11-08      RADIOLOGY & ADDITIONAL TESTS:    Imaging Personally Reviewed:  YES    Consultant(s) Notes Reviewed:   YES    Care Discussed with Consultants: YES    Plan of care was discussed with patient and /or primary care giver; all questions and concerns were addressed and care was aligned with patient's wishes.

## 2017-11-08 NOTE — PROGRESS NOTE ADULT - PROBLEM SELECTOR PLAN 3
sodium improved  Dr Peters on board for nephrology evaluation Sodium elevated again. will give NS 0.45 @ 50cc for 12 hours.   Dr Peters on board for nephrology evaluation nephrology follow up

## 2017-11-08 NOTE — PROGRESS NOTE ADULT - ASSESSMENT
Patient is a 89 year old  Male who presents with a chief complaint of lethargy, is admitted to the medicine floor for PNA likely from community acquired  and possible underlying acute on chronic systolic chf, stap bacteremia. now being evaluated by palliative team for possible disposition to home hospice vs inpatient hospice .Patient's family was contacted multiple times but no response received by them.  and medical resident tried multiple times. voice message was left. Patient Patient is a 89 year old  Male who presents with a chief complaint of lethargy, is admitted to the medicine floor for PNA likely from community acquired  and possible underlying acute on chronic systolic chf, stap bacteremia. now being evaluated by palliative team for possible disposition to home hospice vs inpatient hospice .Patient's family was contacted multiple times but no response received by them.  and medical resident tried multiple times. voice message was left. Discussed with palliative care team as well. 24 hour discharge notice couldn't be signed as Health care proxy and family was not reachable.

## 2017-11-08 NOTE — PROGRESS NOTE ADULT - SUBJECTIVE AND OBJECTIVE BOX
89y Male is under our care for possible pneumonia. Patient is worse today.  More lethargic and minimally arousable now. Audible gurgling noises continue. Family has decided on home hospice services, but have not brought in consent forms yet. Completes course of antibiotics after today.    REVIEW OF SYSTEMS:  [ X ] Not able to illicit    MEDS:  imipenem/cilastatin  IVPB 500 milliGRAM(s) IV Intermittent every 12 hours    ALLERGIES:No Known Allergies    VITALS:  Vital Signs Last 24 Hrs  T(C): 36.5 (08 Nov 2017 08:27), Max: 36.6 (08 Nov 2017 00:00)  T(F): 97.7 (08 Nov 2017 08:27), Max: 97.8 (08 Nov 2017 00:00)  HR: 101 (08 Nov 2017 08:27) (100 - 101)  BP: 93/37 (08 Nov 2017 08:27) (93/37 - 153/82)  BP(mean): --  RR: 17 (08 Nov 2017 08:27) (17 - 20)  SpO2: 99% (08 Nov 2017 08:27) (99% - 100%)SpO2: 98% (07 Nov 2017 07:36) (93% - 98%)      PHYSICAL EXAM:  HEENT: +NC @ 2L/min  Neck: n/a  Respiratory: bilateral rhonchi with scattered rales  Cardiovascular: S1 S2 tachy +loud systolic murmur  Gastrointestinal: soft, nondistended abdominal with +BS; nontender  Extremities: Left arm edema  Skin: no rashes  Ortho: n/a  Neuro: lethargic    LABS/DIAGNOSTIC TESTS:                          13.1   13.6  )-----------( 145      ( 08 Nov 2017 06:34 )             44.0   11-08    149<H>  |  111<H>  |  47<H>  ----------------------------<  99  5.2   |  30  |  2.19<H>    Ca    9.5      08 Nov 2017 06:34  Phos  6.5     11-08  Mg     3.0     11-08      CULTURES:   .Blood Blood-Peripheral  10-30 @ 17:52   No growth at 5 days.  --  --      .Urine Catheterized  10-29 @ 12:23   No growth  --  --      .Blood Blood-Peripheral  10-29 @ 12:16   Growth in aerobic bottle: Coag Negative Staphylococcus  Single set isolate, possible contaminant. Contact      RADIOLOGY:  no new studies

## 2017-11-08 NOTE — PROGRESS NOTE ADULT - ATTENDING COMMENTS
Patient seen and examined. Patient's history, vitals, labs, imaging studies reviewed. Discussed with above resident, agree with note with edits. I called HCP, Mr. Quinteros at [phone], goes to voice mail, left message to call back, and multiple members from the team have reached out to HCP but have not heard back. Patient with very poor prognosis, awaiting  inpatient vs home hospice. Discussed with Dr. Clancy and Dr. Gabriel.  Dinorah Felix MD

## 2017-11-08 NOTE — PROGRESS NOTE ADULT - ATTENDING COMMENTS
Agree with above assessment and plan as outlined above.    - palliative f/u    Mani Reddy MD, FACC  [facility], [facility]  [address] [place] [address]  PHONE:  [phone]  BEEPER : [phone]

## 2017-11-08 NOTE — PROGRESS NOTE ADULT - PROBLEM SELECTOR PLAN 1
WITH STAPH BACTEREMIA  repeat blood cultures - no growth  c/w meropenem day needs two more days.  cxr shows worsening pneumonia WITH STAPH BACTEREMIA  repeat blood cultures - no growth  will complete primaxin today.   cxr shows worsening pneumonia

## 2017-11-08 NOTE — PROGRESS NOTE ADULT - SUBJECTIVE AND OBJECTIVE BOX
pt seen and examined, no complaints on exam.   NAD on exam , resting comfortably     ALBUTerol/ipratropium for Nebulization 3 milliLiter(s) Nebulizer every 6 hours  fentaNYL   Patch  12 MICROgram(s)/Hr 1 Patch Transdermal every 72 hours  glycopyrrolate Injectable 0.4 milliGRAM(s) IV Push every 6 hours PRN  imipenem/cilastatin  IVPB 500 milliGRAM(s) IV Intermittent every 12 hours  LORazepam    Concentrate 0.5 milliGRAM(s) Oral every 4 hours PRN  morphine  - Injectable 2 milliGRAM(s) IV Push every 1 hour PRN  morphine Concentrate 5 milliGRAM(s) Oral every 2 hours PRN  scopolamine   Patch 1.5 milliGRAM(s) Transdermal every 3 days    Hemoglobin: 11.7 g/dL (11-04 @ 07:17)  Hemoglobin: 12.1 g/dL (11-03 @ 07:57)  Creatinine Trend: 1.55<--, 1.48<--, 1.49<--, 1.65<--, 1.81<--, 1.69<--    COAGS:     T(C): 36.6 (11-08-17 @ 00:00), Max: 36.6 (11-08-17 @ 00:00)  HR: 100 (11-08-17 @ 00:00) (89 - 101)  BP: 144/88 (11-08-17 @ 00:00) (144/88 - 153/82)  RR: 18 (11-08-17 @ 00:00) (18 - 20)  SpO2: 99% (11-08-17 @ 00:00) (98% - 100%)  Wt(kg): --    I&O's Summary      HEENT:   Normal oral mucosa, PERRL, EOMI	  Lymphatic: No lymphadenopathy , no edema  Cardiovascular: Normal S1 S2, No JVD, No murmurs , Peripheral pulses palpable 2+ bilaterally  Respiratory: Lungs clear to auscultation, normal effort 	  Gastrointestinal:  Soft, Non-tender, + BS	      TELEMETRY: Afib       DIAGNOSTIC TESTING:  [ ] Echocardiogram: < from: Transthoracic Echocardiogram (10.30.17 @ 07:20) >  CONCLUSIONS:  1. Normal mitral valve. Mild mitral regurgitation.  2. Calcified trileaflet aortic valve with decreased  opening. Adequate velocities were not obtained to estimate  severity of Aortic stenosis. Mild aortic insufficiency.  3. Aortic Root: 3.8 cm.  4. Severe left atrial enlargement.  5. Mild concentric left ventricular hypertrophy.  6. Dilated LV with severe global left ventricular  dysfunction.  7. Grade II diastolic dysfunction.  8. Severe right atrial enlargement.  9. Right ventricular enlargement with normal RV function.  10. RA Pressure is 15 mm Hg.  11. RV systolic pressure is severely increased at  76 mm  Hg.  12. There is moderate tricuspid regurgitation.  13. There is mild pulmonic regurgitation.  14. Normal pericardium with no pericardial effusion.    < end of copied text >    [ ]  Catheterization:  [ ] Stress Test:    OTHER: 	        ASSESSMENT/PLAN: 	89y Male minimally verbal, with PMH of CAD s/p triple bypass surgery. CHF on diuretics, afib not on AC bradycardia refused PPM in the past admitted with PNA found with an EF of 10-15%    Aspiration precaution   cont asa , supp  echo with severe lv dfx. ef 15 %  ABx per medicine - ID noted on pramaxin now .   conservative management for CM,,   pt appears euvolemic on exam.   palliative consult     D/W Dr Reddy

## 2017-11-08 NOTE — PROGRESS NOTE ADULT - PROBLEM SELECTOR PLAN 6
palliative following, DNR/DNI now   not eating  candidate for inpatient vs home hospice  discussed with Dr. Gabriel, family wants to go to home hospice. palliative following, DNR/DNI now   not eating  candidate for inpatient vs home hospice  discussed with Dr. Gabriel, family wants to go to home hospice. but were not reachable. decision not clear between inpatient vs home hospice.

## 2017-11-08 NOTE — PROGRESS NOTE ADULT - ASSESSMENT
1.	?Pneumonia   ·	was started on primaxin 500mg IV q12h yesterday due to meropenem shortage, completes course after today  ·	reconsult prn 1.	?Pneumonia   ·	was started on primaxin 500mg IV q12h yesterday due to meropenem shortage, completes course after today  ·	patient's condition is extremely poor and not expected to survive; though pt's antibiotic tx is almost complete, will monitor patient off antibiotics for now

## 2017-11-09 RX ORDER — FENTANYL CITRATE 50 UG/ML
1 INJECTION INTRAVENOUS
Qty: 0 | Refills: 0 | COMMUNITY
Start: 2017-11-09

## 2017-11-09 RX ORDER — MORPHINE SULFATE 50 MG/1
0.5 CAPSULE, EXTENDED RELEASE ORAL
Qty: 0 | Refills: 0 | COMMUNITY
Start: 2017-11-09

## 2017-11-09 RX ORDER — SCOPALAMINE 1 MG/3D
0 PATCH, EXTENDED RELEASE TRANSDERMAL
Qty: 0 | Refills: 0 | COMMUNITY
Start: 2017-11-09

## 2017-11-09 RX ADMIN — Medication 3 MILLILITER(S): at 21:24

## 2017-11-09 RX ADMIN — SCOPALAMINE 1.5 MILLIGRAM(S): 1 PATCH, EXTENDED RELEASE TRANSDERMAL at 18:06

## 2017-11-09 RX ADMIN — Medication 3 MILLILITER(S): at 09:45

## 2017-11-09 RX ADMIN — SCOPALAMINE 1.5 MILLIGRAM(S): 1 PATCH, EXTENDED RELEASE TRANSDERMAL at 12:43

## 2017-11-09 RX ADMIN — Medication 3 MILLILITER(S): at 14:46

## 2017-11-09 RX ADMIN — IMIPENEM AND CILASTATIN 100 MILLIGRAM(S): 250; 250 INJECTION, POWDER, FOR SOLUTION INTRAVENOUS at 05:48

## 2017-11-09 NOTE — PROGRESS NOTE ADULT - PROBLEM SELECTOR PLAN 2
No rhonchi appreciated  PNA likely from community acquired vs aspiration  and possible underlying acute on chronic systolic chf.  cxr shows possible bilateral lower lobe infiltrate vs pulmonary edema  echo ef of 15 percent  Cardiology consulted no intervention at this time  c/w watchful monitoring

## 2017-11-09 NOTE — PROGRESS NOTE ADULT - ATTENDING COMMENTS
Agree with above assessment and plan as outlined above.    - Palliative f/u    Mani Reddy MD, FACC  [facility], [facility]  [address] [place] [address]  PHONE:  [phone]  BEEPER : [phone]

## 2017-11-09 NOTE — PROGRESS NOTE ADULT - ASSESSMENT
Patient is a 89 year old  Male who presents with a chief complaint of lethargy, is admitted to the medicine floor for PNA likely from community acquired  and possible underlying acute on chronic systolic chf, stap bacteremia. now being evaluated by palliative team for possible disposition to home hospice vs inpatient hospice .Patient's family was contacted multiple times but no response received by them.  and medical resident tried multiple times. voice message was left. Discussed with palliative care team as well. 24 hour discharge notice couldn't be signed as Health care proxy and family was not reachable. Patient is a 89 year old  Male who presents with a chief complaint of lethargy, is admitted to the medicine floor for PNA likely from community acquired  and possible underlying acute on chronic systolic chf, stap bacteremia. now being evaluated by palliative team for possible disposition to home hospice vs inpatient hospice

## 2017-11-09 NOTE — PROGRESS NOTE ADULT - ATTENDING COMMENTS
Patient seen and examined. Patient's history, vitals, labs, imaging studies reviewed. Discussed with above resident, agree with note with edits. Patient with very poor prognosis, awaiting  inpatient vs home hospice. Discussed with Dr. Clancy and Dr. Gabriel.  Dinorah Felix MD  11/9/2017

## 2017-11-09 NOTE — PROGRESS NOTE ADULT - SUBJECTIVE AND OBJECTIVE BOX
89y Male is under our care for possible pneumonia. Patient is continues with clinical decline. Patient is obtunded and audible gurgling persist. Completed course of antibiotics yesterday, remains afebrile. Will monitor for now.    REVIEW OF SYSTEMS:  [ X ] Not able to illicit    MEDS: no antibiotics     ALLERGIES:No Known Allergies    VITALS:  Vital Signs Last 24 Hrs  T(C): 36.7 (09 Nov 2017 07:30), Max: 36.8 (08 Nov 2017 16:26)  T(F): 98.1 (09 Nov 2017 07:30), Max: 98.3 (08 Nov 2017 16:26)  HR: 90 (09 Nov 2017 07:30) (90 - 100)  BP: 117/48 (09 Nov 2017 07:30) (95/53 - 117/48)  BP(mean): --  RR: 16 (09 Nov 2017 07:30) (16 - 16)  SpO2: 92% (09 Nov 2017 07:30) (88% - 92%)      PHYSICAL EXAM:  HEENT: +NC @ 2L/min  Neck: n/a  Respiratory: bilateral rhonchi with scattered rales  Cardiovascular: S1 S2 tachy +loud systolic murmur  Gastrointestinal: soft, nondistended abdominal with +BS; nontender  Extremities: Left arm edema  Skin: no rashes  Ortho: n/a  Neuro: obtunded    LABS/DIAGNOSTIC TESTS:  No new labs      CULTURES:   .Blood Blood-Peripheral  10-30 @ 17:52   No growth at 5 days.  --  --      .Urine Catheterized  10-29 @ 12:23   No growth  --  --      .Blood Blood-Peripheral  10-29 @ 12:16   Growth in aerobic bottle: Coag Negative Staphylococcus  Single set isolate, possible contaminant. Contact      RADIOLOGY:  no new studies

## 2017-11-09 NOTE — PROGRESS NOTE ADULT - PROBLEM SELECTOR PLAN 6
palliative following, DNR/DNI now   not eating  candidate for inpatient vs home hospice  discussed with Dr. Gabriel, family wants to go to home hospice. but were not reachable. decision not clear between inpatient vs home hospice. Apparently consent was signed, arrangements to be made by  for Home hospice.

## 2017-11-09 NOTE — PROGRESS NOTE ADULT - PROBLEM SELECTOR PLAN 1
WITH STAPH BACTEREMIA  repeat blood cultures - no growth  completed Antibiotics   cxr showed worsening pneumonia  MEWS suspended WITH STAPH BACTEREMIA  repeat blood cultures - no growth  repeat cxr had showed worsening pneumonia  completed course of IV antibiotics   MEWS suspended

## 2017-11-09 NOTE — PROGRESS NOTE ADULT - SUBJECTIVE AND OBJECTIVE BOX
PGY 1 Note discussed with supervising resident and primary attending    Patient is a 89y old  Male who presents with a chief complaint of lethargy (04 Nov 2017 22:28)      INTERVAL HPI/OVERNIGHT EVENTS:  patient was seen and examined at bed side. No progress from yesterday.     MEDICATIONS  (STANDING):  ALBUTerol/ipratropium for Nebulization 3 milliLiter(s) Nebulizer every 6 hours  fentaNYL   Patch  12 MICROgram(s)/Hr 1 Patch Transdermal every 72 hours  scopolamine   Patch 1.5 milliGRAM(s) Transdermal every 3 days    MEDICATIONS  (PRN):  glycopyrrolate Injectable 0.4 milliGRAM(s) IV Push every 6 hours PRN Secretions  LORazepam    Concentrate 0.5 milliGRAM(s) Oral every 4 hours PRN Agitation  morphine  - Injectable 2 milliGRAM(s) IV Push every 1 hour PRN Dyspnea  morphine Concentrate 5 milliGRAM(s) Oral every 2 hours PRN Dyspnea      __________________________________________________  REVIEW OF SYSTEMS:    couldn't assess due to condition of patient. Afebrile.     Vital Signs Last 24 Hrs  T(C): 36.7 (09 Nov 2017 07:30), Max: 36.8 (08 Nov 2017 16:26)  T(F): 98.1 (09 Nov 2017 07:30), Max: 98.3 (08 Nov 2017 16:26)  HR: 90 (09 Nov 2017 07:30) (90 - 100)  BP: 117/48 (09 Nov 2017 07:30) (95/53 - 117/48)  BP(mean): --  RR: 16 (09 Nov 2017 07:30) (16 - 16)  SpO2: 92% (09 Nov 2017 07:30) (88% - 92%)    ________________________________________________  PHYSICAL EXAM:  GENERAL: Drowsy, Non responsive, poor dental hygiene.   HEENT: dry mucous membranes, temporal wasting  HEART: S1 S2  regular; no murmurs, gallops or rubs  ABDOMEN: Soft, Nontender, Nondistended; Bowel sounds present  EXTREMITIES: no cyanosis; no edema; no calf tenderness  SKIN: warm and dry; no rash  NERVOUS SYSTEM:  Awake and alert; Oriented  to place, person and time ; no new deficits    _________________________________________________  LABS:                        13.1   13.6  )-----------( 145      ( 08 Nov 2017 06:34 )             44.0     11-08    149<H>  |  111<H>  |  47<H>  ----------------------------<  99  5.2   |  30  |  2.19<H>    Ca    9.5      08 Nov 2017 06:34  Phos  6.5     11-08  Mg     3.0     11-08          CAPILLARY BLOOD GLUCOSE            RADIOLOGY & ADDITIONAL TESTS:    Imaging Personally Reviewed:  YES    Consultant(s) Notes Reviewed:   YES    Care Discussed with Consultants :     Plan of care was discussed with patient and /or primary care giver; all questions and concerns were addressed and care was aligned with patient's wishes. PGY 1 Note discussed with supervising resident and primary attending    Patient is a 89y old  Male who presents with a chief complaint of lethargy (04 Nov 2017 22:28)      INTERVAL HPI/OVERNIGHT EVENTS:  patient was seen and examined at bed side. No progress from yesterday.     MEDICATIONS  (STANDING):  ALBUTerol/ipratropium for Nebulization 3 milliLiter(s) Nebulizer every 6 hours  fentaNYL   Patch  12 MICROgram(s)/Hr 1 Patch Transdermal every 72 hours  scopolamine   Patch 1.5 milliGRAM(s) Transdermal every 3 days    MEDICATIONS  (PRN):  glycopyrrolate Injectable 0.4 milliGRAM(s) IV Push every 6 hours PRN Secretions  LORazepam    Concentrate 0.5 milliGRAM(s) Oral every 4 hours PRN Agitation  morphine  - Injectable 2 milliGRAM(s) IV Push every 1 hour PRN Dyspnea  morphine Concentrate 5 milliGRAM(s) Oral every 2 hours PRN Dyspnea      __________________________________________________  REVIEW OF SYSTEMS:    couldn't assess due to condition of patient. Afebrile.     Vital Signs Last 24 Hrs  T(C): 36.7 (09 Nov 2017 07:30), Max: 36.8 (08 Nov 2017 16:26)  T(F): 98.1 (09 Nov 2017 07:30), Max: 98.3 (08 Nov 2017 16:26)  HR: 90 (09 Nov 2017 07:30) (90 - 100)  BP: 117/48 (09 Nov 2017 07:30) (95/53 - 117/48)  RR: 16 (09 Nov 2017 07:30) (16 - 16)  SpO2: 92% (09 Nov 2017 07:30) (88% - 92%)    ________________________________________________  PHYSICAL EXAM:  GENERAL: Drowsy, Non verbal  HEENT: moist mucous membranes, temporal wasting  HEART: S1 S2  regular; no murmurs, gallops or rubs  ABDOMEN: Soft, Nontender, Nondistended; Bowel sounds present  EXTREMITIES: no cyanosis; no edema; no calf tenderness  SKIN: warm and dry; no rash  NERVOUS SYSTEM:   drowsy; no new deficits    _________________________________________________  LABS:                        13.1   13.6  )-----------( 145      ( 08 Nov 2017 06:34 )             44.0     11-08    149<H>  |  111<H>  |  47<H>  ----------------------------<  99  5.2   |  30  |  2.19<H>    Ca    9.5      08 Nov 2017 06:34  Phos  6.5     11-08  Mg     3.0     11-08        RADIOLOGY & ADDITIONAL TESTS:    Imaging Personally Reviewed:  YES    Consultant(s) Notes Reviewed:   YES    Care Discussed with Consultants: YES    Plan of care was discussed with patient and /or primary care giver; all questions and concerns were addressed and care was aligned with patient's wishes.

## 2017-11-09 NOTE — PROGRESS NOTE ADULT - SUBJECTIVE AND OBJECTIVE BOX
pt seen and examined, no complaints on exam.   NAD on exam ,   no events overnight     ALBUTerol/ipratropium for Nebulization 3 milliLiter(s) Nebulizer every 6 hours  fentaNYL   Patch  12 MICROgram(s)/Hr 1 Patch Transdermal every 72 hours  glycopyrrolate Injectable 0.4 milliGRAM(s) IV Push every 6 hours PRN  LORazepam    Concentrate 0.5 milliGRAM(s) Oral every 4 hours PRN  morphine  - Injectable 2 milliGRAM(s) IV Push every 1 hour PRN  morphine Concentrate 5 milliGRAM(s) Oral every 2 hours PRN  scopolamine   Patch 1.5 milliGRAM(s) Transdermal every 3 days                            13.1   13.6  )-----------( 145      ( 08 Nov 2017 06:34 )             44.0       Hemoglobin: 13.1 g/dL (11-08 @ 06:34)  Hemoglobin: 11.7 g/dL (11-04 @ 07:17)      11-08    149<H>  |  111<H>  |  47<H>  ----------------------------<  99  5.2   |  30  |  2.19<H>    Ca    9.5      08 Nov 2017 06:34  Phos  6.5     11-08  Mg     3.0     11-08      Creatinine Trend: 2.19<--, 1.55<--, 1.48<--, 1.49<--, 1.65<--, 1.81<--    COAGS:   T(C): 36.7 (11-08-17 @ 23:54), Max: 36.8 (11-08-17 @ 16:26)  HR: 100 (11-08-17 @ 23:54) (93 - 101)  BP: 98/55 (11-08-17 @ 23:54) (93/37 - 98/55)  RR: 16 (11-08-17 @ 23:54) (16 - 17)  SpO2: 91% (11-08-17 @ 23:54) (88% - 99%)  Wt(kg): --    I&O's Summary    HEENT:   Normal oral mucosa, PERRL, EOMI	  Lymphatic: No lymphadenopathy , no edema  Cardiovascular: Normal S1 S2, No JVD, No murmurs , Peripheral pulses palpable 2+ bilaterally  Respiratory: Lungs clear to auscultation, normal effort 	  Gastrointestinal:  Soft, Non-tender, + BS	      TELEMETRY: Afib       DIAGNOSTIC TESTING:  [ ] Echocardiogram: < from: Transthoracic Echocardiogram (10.30.17 @ 07:20) >  CONCLUSIONS:  1. Normal mitral valve. Mild mitral regurgitation.  2. Calcified trileaflet aortic valve with decreased  opening. Adequate velocities were not obtained to estimate  severity of Aortic stenosis. Mild aortic insufficiency.  3. Aortic Root: 3.8 cm.  4. Severe left atrial enlargement.  5. Mild concentric left ventricular hypertrophy.  6. Dilated LV with severe global left ventricular  dysfunction.  7. Grade II diastolic dysfunction.  8. Severe right atrial enlargement.  9. Right ventricular enlargement with normal RV function.  10. RA Pressure is 15 mm Hg.  11. RV systolic pressure is severely increased at  76 mm  Hg.  12. There is moderate tricuspid regurgitation.  13. There is mild pulmonic regurgitation.  14. Normal pericardium with no pericardial effusion.    < end of copied text >    [ ]  Catheterization:  [ ] Stress Test:    OTHER: 	        ASSESSMENT/PLAN: 	89y Male minimally verbal, with PMH of CAD s/p triple bypass surgery. CHF on diuretics, afib not on AC bradycardia refused PPM in the past admitted with PNA found with an EF of 10-15%  palliative care.     Aspiration precaution   echo with severe lv dfx. ef 15 %  conservative management for CM,,   pain management / comfort care per palliative care ,   D/W Dr Reddy

## 2017-11-10 ENCOUNTER — INPATIENT (INPATIENT)
Facility: HOSPITAL | Age: 82
LOS: 1 days | DRG: 177 | End: 2017-11-12
Attending: INTERNAL MEDICINE | Admitting: INTERNAL MEDICINE
Payer: OTHER MISCELLANEOUS

## 2017-11-10 VITALS
OXYGEN SATURATION: 96 % | HEART RATE: 79 BPM | RESPIRATION RATE: 16 BRPM | TEMPERATURE: 97 F | DIASTOLIC BLOOD PRESSURE: 74 MMHG | SYSTOLIC BLOOD PRESSURE: 128 MMHG

## 2017-11-10 DIAGNOSIS — J69.0 PNEUMONITIS DUE TO INHALATION OF FOOD AND VOMIT: ICD-10-CM

## 2017-11-10 DIAGNOSIS — Z95.1 PRESENCE OF AORTOCORONARY BYPASS GRAFT: Chronic | ICD-10-CM

## 2017-11-10 RX ORDER — MORPHINE SULFATE 50 MG/1
2 CAPSULE, EXTENDED RELEASE ORAL
Qty: 0 | Refills: 0 | Status: DISCONTINUED | OUTPATIENT
Start: 2017-11-10 | End: 2017-11-12

## 2017-11-10 RX ORDER — ACETAMINOPHEN 500 MG
650 TABLET ORAL
Qty: 0 | Refills: 0 | Status: DISCONTINUED | OUTPATIENT
Start: 2017-11-10 | End: 2017-11-12

## 2017-11-10 RX ORDER — ROBINUL 0.2 MG/ML
0.4 INJECTION INTRAMUSCULAR; INTRAVENOUS EVERY 6 HOURS
Qty: 0 | Refills: 0 | Status: DISCONTINUED | OUTPATIENT
Start: 2017-11-10 | End: 2017-11-12

## 2017-11-10 RX ORDER — IPRATROPIUM/ALBUTEROL SULFATE 18-103MCG
3 AEROSOL WITH ADAPTER (GRAM) INHALATION EVERY 6 HOURS
Qty: 0 | Refills: 0 | Status: DISCONTINUED | OUTPATIENT
Start: 2017-11-10 | End: 2017-11-12

## 2017-11-10 RX ORDER — MORPHINE SULFATE 50 MG/1
0.5 CAPSULE, EXTENDED RELEASE ORAL
Qty: 100 | Refills: 0 | Status: DISCONTINUED | OUTPATIENT
Start: 2017-11-10 | End: 2017-11-12

## 2017-11-10 RX ORDER — MORPHINE SULFATE 50 MG/1
2 CAPSULE, EXTENDED RELEASE ORAL
Qty: 0 | Refills: 0 | Status: DISCONTINUED | OUTPATIENT
Start: 2017-11-10 | End: 2017-11-10

## 2017-11-10 RX ADMIN — Medication 3 MILLILITER(S): at 09:00

## 2017-11-10 RX ADMIN — Medication 3 MILLILITER(S): at 20:26

## 2017-11-10 RX ADMIN — MORPHINE SULFATE 0.5 MG/HR: 50 CAPSULE, EXTENDED RELEASE ORAL at 22:21

## 2017-11-10 RX ADMIN — FENTANYL CITRATE 1 PATCH: 50 INJECTION INTRAVENOUS at 13:12

## 2017-11-10 RX ADMIN — MORPHINE SULFATE 2 MILLIGRAM(S): 50 CAPSULE, EXTENDED RELEASE ORAL at 16:24

## 2017-11-10 RX ADMIN — FENTANYL CITRATE 1 PATCH: 50 INJECTION INTRAVENOUS at 13:09

## 2017-11-10 RX ADMIN — MORPHINE SULFATE 0.5 MG/HR: 50 CAPSULE, EXTENDED RELEASE ORAL at 23:00

## 2017-11-10 RX ADMIN — ROBINUL 0.4 MILLIGRAM(S): 0.2 INJECTION INTRAMUSCULAR; INTRAVENOUS at 22:21

## 2017-11-10 RX ADMIN — Medication 3 MILLILITER(S): at 15:18

## 2017-11-10 NOTE — PROGRESS NOTE ADULT - ASSESSMENT
Patient is a 89 year old  Male who presents with a chief complaint of lethargy, is admitted to the medicine floor for PNA likely from community acquired  and possible underlying acute on chronic systolic chf, stap bacteremia. now being evaluated by palliative team for possible disposition to home hospice vs inpatient hospice

## 2017-11-10 NOTE — H&P ADULT - NSHPPHYSICALEXAM_GEN_ALL_CORE
thin male in mild respiratory distress  143/57 78 97.7 198 98%  HEENT: moderate bitemporal wasting  Neck: no JVD no nodes on thyromegaly  Lungs: decreased breath sounds and rales at bases  Heart: s1s2 2/6 SUKHDEEP at right base  Abd: soft ND NT no masses no organomegaly  Ext" 1+ pitting edema bilaterally, no redness

## 2017-11-10 NOTE — PROGRESS NOTE ADULT - SUBJECTIVE AND OBJECTIVE BOX
Medicine attending note:    Patient is a 89y old  Male who presents with a chief complaint of lethargy (04 Nov 2017 22:28)      INTERVAL HPI/OVERNIGHT EVENTS:  patient was seen and examined at bed side, non verbal    MEDICATIONS  (STANDING):  ALBUTerol/ipratropium for Nebulization 3 milliLiter(s) Nebulizer every 6 hours  fentaNYL   Patch  12 MICROgram(s)/Hr 1 Patch Transdermal every 72 hours  scopolamine   Patch 1.5 milliGRAM(s) Transdermal every 3 days    MEDICATIONS  (PRN):  glycopyrrolate Injectable 0.4 milliGRAM(s) IV Push every 6 hours PRN Secretions  LORazepam   Injectable 0.5 milliGRAM(s) IV Push every 4 hours PRN Agitation  morphine  - Injectable 2 milliGRAM(s) IV Push every 1 hour PRN Dyspnea    __________________________________________________  REVIEW OF SYSTEMS: unable to elicit to to patient's medical condition    PHYSICAL EXAM:  T(C): 36.3 (11-10-17 @ 16:33), Max: 36.8 (11-09-17 @ 23:55)  HR: 79 (11-10-17 @ 16:33) (79 - 94)  BP: 128/74 (11-10-17 @ 16:33) (84/54 - 128/74)  RR: 16 (11-10-17 @ 16:33) (16 - 16)  SpO2: 96% (11-10-17 @ 16:33) (90% - 96%)      GENERAL: Elderly male, non verbal  HEAD:  Atraumatic, Normocephalic, temporal wasting  EENT: moist mucous membranes  NECK: Supple  HEART: S1 S2  regular;  murmur  CHEST/LUNG: b/l rales  ABDOMEN: Soft, Nontender, Nondistended; Bowel sounds present  EXTREMITIES: 2+ Peripheral Pulses, no cyanosis; no edema; no calf tenderness  SKIN: warm and dry; no rash  NERVOUS SYSTEM:  drowsy  _________________________________________________  LABS: none today    RADIOLOGY & ADDITIONAL TESTS:    Imaging Personally Reviewed:  YES    Consultant(s) Notes Reviewed:   YES    Care Discussed with Consultants: YES    Plan of care was discussed with patient and /or primary care giver; all questions and concerns were addressed and care was aligned with patient's wishes.

## 2017-11-10 NOTE — H&P ADULT - HISTORY OF PRESENT ILLNESS
89 year old male with hospice diagnosis of end stage systolic CHF (EF 10%) with comorbid conditions of CAD post CABG, PPM with past medical history of CKD with increasing lethargy and dyspnea admitted to the hospital.  Despite optimal treatment patient's condition continued to decline and the surrogate elected for hospice.  The patient was admitted to the IPU at [facility] for IV management of dyspnea.

## 2017-11-10 NOTE — PROGRESS NOTE ADULT - SUBJECTIVE AND OBJECTIVE BOX
Patient remains obtunded, not answering questions    ALBUTerol/ipratropium for Nebulization 3 milliLiter(s) Nebulizer every 6 hours  fentaNYL   Patch  12 MICROgram(s)/Hr 1 Patch Transdermal every 72 hours  glycopyrrolate Injectable 0.4 milliGRAM(s) IV Push every 6 hours PRN  LORazepam    Concentrate 0.5 milliGRAM(s) Oral every 4 hours PRN  morphine  - Injectable 2 milliGRAM(s) IV Push every 1 hour PRN  morphine Concentrate 5 milliGRAM(s) Oral every 2 hours PRN  scopolamine   Patch 1.5 milliGRAM(s) Transdermal every 3 days          Hemoglobin: 13.1 g/dL (11-08 @ 06:34)            Creatinine Trend: 2.19<--, 1.55<--, 1.48<--, 1.49<--, 1.65<--, 1.81<--    COAGS:           T(C): 36.2 (11-10-17 @ 09:40), Max: 36.8 (11-09-17 @ 23:55)  HR: 81 (11-10-17 @ 09:40) (81 - 96)  BP: 84/54 (11-10-17 @ 09:40) (84/54 - 130/54)  RR: 16 (11-10-17 @ 09:40) (16 - 16)  SpO2: 91% (11-10-17 @ 09:40) (90% - 92%)  Wt(kg): --    I&O's Summary       HEENT:   Normal oral mucosa,   Lymphatic: No lymphadenopathy , no edema  Cardiovascular: Normal S1 S2, No JVD, No murmurs , Peripheral pulses palpable 2+ bilaterally  Respiratory: shallow coarse BS B/L,  diminished effort 	  Gastrointestinal:  Soft, Non-tender, + BS	             ASSESSMENT/PLAN: 	89y Male minimally verbal, with PMH of CAD s/p triple bypass surgery. CHF on diuretics, afib not on AC bradycardia refused PPM in the past admitted with PNA found with an EF of 10-15%  palliative care.     - Aspiration precaution   - echo with severe lv dfx. ef 15 %  - conservative management for CM,,   - completed course of ABx, cont to observe  - Overall prognosis is poor, palliative f/u    Mani Reddy MD, FACC  [facility], [facility]  [address] [place] [address]  PHONE:  [phone]  BEEPER : [phone]

## 2017-11-10 NOTE — H&P ADULT - ASSESSMENT
89 year old male with end stage CHF admitted to the IPU at [facility] for IV management of dyspnea

## 2017-11-10 NOTE — PROGRESS NOTE ADULT - PROVIDER SPECIALTY LIST ADULT
Cardiology
Infectious Disease
Internal Medicine
Nephrology
Nephrology
Palliative Care
Palliative Care
Cardiology
Internal Medicine

## 2017-11-10 NOTE — PROGRESS NOTE ADULT - ATTENDING COMMENTS
Dinorah Felix MD  11/10/2017 Patient for discharge to inpatient hospice today. See discharge note for details.  Dinorah Felix MD  11/10/2017

## 2017-11-10 NOTE — PROGRESS NOTE ADULT - ASSESSMENT
1.	?Pneumonia   ·	off antibiotics, course complete  ·	will monitor patient off antibiotics for now  ·	inpatient vs home hospice

## 2017-11-10 NOTE — PROGRESS NOTE ADULT - PROBLEM SELECTOR PLAN 1
WITH STAPH BACTEREMIA  repeat blood cultures - no growth  repeat cxr had showed worsening pneumonia  completed course of IV antibiotics

## 2017-11-10 NOTE — PROGRESS NOTE ADULT - SUBJECTIVE AND OBJECTIVE BOX
89y Male is under our care for possible pneumonia. Patient is in same disposition of continuous clinical decline. Remains off antibiotics and will continue to monitor for now.    REVIEW OF SYSTEMS:  [ X ] Not able to illicit    MEDS: no antibiotics     ALLERGIES:No Known Allergies    VITALS:  Vital Signs Last 24 Hrs  T(C): 36.2 (10 Nov 2017 09:40), Max: 36.8 (09 Nov 2017 23:55)  T(F): 97.2 (10 Nov 2017 09:40), Max: 98.3 (09 Nov 2017 23:55)  HR: 81 (10 Nov 2017 09:40) (81 - 96)  BP: 84/54 (10 Nov 2017 09:40) (84/54 - 130/54)  BP(mean): --  RR: 16 (10 Nov 2017 09:40) (16 - 16)  SpO2: 91% (10 Nov 2017 09:40) (90% - 92%)    PHYSICAL EXAM:  HEENT: +NC @ 2L/min  Neck: n/a  Respiratory: bilateral rhonchi with scattered rales  Cardiovascular: S1 S2 tachy +loud systolic murmur  Gastrointestinal: soft, nondistended abdominal with +BS; nontender  Extremities: mild edema of arms  Skin: no rashes  Ortho: n/a  Neuro: obtunded    LABS/DIAGNOSTIC TESTS:  No new labs      CULTURES:   .Blood Blood-Peripheral  10-30 @ 17:52   No growth at 5 days.  --  --      .Urine Catheterized  10-29 @ 12:23   No growth  --  --      .Blood Blood-Peripheral  10-29 @ 12:16   Growth in aerobic bottle: Coag Negative Staphylococcus  Single set isolate, possible contaminant. Contact      RADIOLOGY:  no new studies

## 2017-11-11 VITALS
SYSTOLIC BLOOD PRESSURE: 120 MMHG | HEART RATE: 83 BPM | RESPIRATION RATE: 16 BRPM | DIASTOLIC BLOOD PRESSURE: 62 MMHG | OXYGEN SATURATION: 99 %

## 2017-11-11 RX ADMIN — Medication 3 MILLILITER(S): at 08:41

## 2017-11-11 RX ADMIN — ROBINUL 0.4 MILLIGRAM(S): 0.2 INJECTION INTRAMUSCULAR; INTRAVENOUS at 12:12

## 2017-11-11 RX ADMIN — Medication 3 MILLILITER(S): at 20:44

## 2017-11-11 RX ADMIN — MORPHINE SULFATE 2 MILLIGRAM(S): 50 CAPSULE, EXTENDED RELEASE ORAL at 14:17

## 2017-11-11 RX ADMIN — ROBINUL 0.4 MILLIGRAM(S): 0.2 INJECTION INTRAMUSCULAR; INTRAVENOUS at 18:32

## 2017-11-11 RX ADMIN — Medication 3 MILLILITER(S): at 02:50

## 2017-11-11 RX ADMIN — ROBINUL 0.4 MILLIGRAM(S): 0.2 INJECTION INTRAMUSCULAR; INTRAVENOUS at 05:58

## 2017-11-11 RX ADMIN — Medication 3 MILLILITER(S): at 15:15

## 2017-11-11 RX ADMIN — MORPHINE SULFATE 2 MILLIGRAM(S): 50 CAPSULE, EXTENDED RELEASE ORAL at 14:18

## 2017-11-11 NOTE — PROGRESS NOTE ADULT - SUBJECTIVE AND OBJECTIVE BOX
89 year old male with Hospice diagnosis of Systolic CHF, EF=10-15%, admitted to [facility] for presentation of lethargy and acute on chronic CHF, +CAP completed tx with ABx.  No longer taking PO.  Patient had discussion with palliative care and made DNR and decision made for comfort care only.  Patient is with refractory dyspnea and congestion/secretions requiring inpatient level of care and has entered dying phase.   Overnight Morphine 0.5 mg IV/hour started for dyspnea.  DuoNebs given x1.  Glycopyrrolate IV x1 given.      Physical Exam   P 83, bp 120/62, RR 16, 99 % on O2NC 2lpm.   Gen: Nonresponsive.   CVS: S1S2.   Lungs: Congested bilaterally.   Abdomen: nondistended, +BS.   Ext: no significant edema.

## 2017-11-11 NOTE — PROGRESS NOTE ADULT - ASSESSMENT
89 year old male es chf, dying on inpatient hospice care for management of SOB and Congestion.       Problem/Plan - 1:  ·  Problem: SOB (shortness of breath), from ES CHF and recent PNA.     Plan: Continue with Morphine ATC and Lorazepam as needed for such.         Problem/Plan - 2:    ·  Problem: Congestion/Secretions; Appears to have entered dying phase   Plan: On Continue with Glycopyrrolate as needed.         Problem/Plan - 3:  Problem: Bacteremia.  Plan: WITH STAPH BACTEREMIA  completed course of IV antibiotics.          Problem/Plan - 4:  ·  Dispo:  actively dying; management and support for such.  HCP was not available this am.  Condition discussed with them yesterday afternoon by Palliative MD and PA who I collaborated with at the time.  Discussed care plan with unit RN.       Problem/Plan - 5:  Problem: Goals of care for comfort.  Patient is DNR.

## 2017-11-12 VITALS
SYSTOLIC BLOOD PRESSURE: 110 MMHG | TEMPERATURE: 97 F | RESPIRATION RATE: 15 BRPM | OXYGEN SATURATION: 81 % | HEART RATE: 53 BPM

## 2017-11-12 DIAGNOSIS — R52 PAIN, UNSPECIFIED: ICD-10-CM

## 2017-11-12 DIAGNOSIS — R06.00 DYSPNEA, UNSPECIFIED: ICD-10-CM

## 2017-11-12 DIAGNOSIS — L98.9 DISORDER OF THE SKIN AND SUBCUTANEOUS TISSUE, UNSPECIFIED: ICD-10-CM

## 2017-11-12 DIAGNOSIS — R41.0 DISORIENTATION, UNSPECIFIED: ICD-10-CM

## 2017-11-12 DIAGNOSIS — K59.03 DRUG INDUCED CONSTIPATION: ICD-10-CM

## 2017-11-12 DIAGNOSIS — I50.23 ACUTE ON CHRONIC SYSTOLIC (CONGESTIVE) HEART FAILURE: ICD-10-CM

## 2017-11-12 DIAGNOSIS — K11.7 DISTURBANCES OF SALIVARY SECRETION: ICD-10-CM

## 2017-11-12 PROCEDURE — 94640 AIRWAY INHALATION TREATMENT: CPT

## 2017-11-12 RX ADMIN — MORPHINE SULFATE 2 MILLIGRAM(S): 50 CAPSULE, EXTENDED RELEASE ORAL at 00:05

## 2017-11-12 RX ADMIN — ROBINUL 0.4 MILLIGRAM(S): 0.2 INJECTION INTRAMUSCULAR; INTRAVENOUS at 06:32

## 2017-11-12 RX ADMIN — MORPHINE SULFATE 2 MILLIGRAM(S): 50 CAPSULE, EXTENDED RELEASE ORAL at 00:25

## 2017-11-12 RX ADMIN — Medication 3 MILLILITER(S): at 03:28

## 2017-11-12 RX ADMIN — ROBINUL 0.4 MILLIGRAM(S): 0.2 INJECTION INTRAMUSCULAR; INTRAVENOUS at 00:05

## 2017-11-12 NOTE — DISCHARGE NOTE FOR THE EXPIRED PATIENT - HOSPITAL COURSE
89 year old male with hospice admitted to inpatient hospice on 11- with diagnosis of end stage systolic CHF (EF 10%) with comorbid conditions of CAD post CABG, PPM with past medical history of CKD with increasing lethargy and dyspnea admitted to the hospital.  Despite optimal treatment patient's condition continued to decline and the surrogate elected for hospice.  The patient was admitted to the IPU at [facility] for IV management of dyspnea.     Patient  at 9:50 am. HCP was informed. No autopsy requested by HCP.

## 2017-11-12 NOTE — PROGRESS NOTE ADULT - SUBJECTIVE AND OBJECTIVE BOX
89 year old male with Hospice diagnosis of Systolic CHF, EF=10-15%, admitted to [facility] for presentation of lethargy and acute on chronic CHF, +CAP completed tx with ABx.  No longer taking PO.  Patient had discussion with palliative care and made DNR and decision made for comfort care only.  Patient is with refractory dyspnea and congestion/secretions requiring inpatient level of care and has entered dying phase.   Last 24 hours: Morphine 2 mg IV x2 given.  Glycopyrrolate IV x4 given.    Looks more comfortble this morning at bedside with less secretions.     Physical Exam   T 97.1, P 53, /, RR 15.    Gen: Nonresponsive.   CVS: S1S2.   Lungs: Congested bilaterally.   Abdomen: nondistended, +BS.   Ext: no significant edema.

## 2017-11-12 NOTE — PROGRESS NOTE ADULT - ASSESSMENT
Assessment and Plan:   · Assessment		  89 year old male es chf, dying on inpatient hospice care for management of SOB and Congestion.       Problem/Plan - 1:  ·  Problem: SOB (shortness of breath), from ES CHF and recent PNA.     Plan: Continue with Morphine ATC and Lorazepam as needed for such.         Problem/Plan - 2:    ·  Problem: Congestion/Secretions; Appears to have entered dying phase   Plan: On Continue with Glycopyrrolate as needed.         Problem/Plan - 3:  Problem: Bacteremia.  Plan: WITH STAPH BACTEREMIA  completed course of IV antibiotics.          Problem/Plan - 4:  ·  Dispo:  actively dying; management and support for such.        Problem/Plan - 5:  Problem: Goals of care for comfort.  Patient is DNR.

## 2017-12-19 PROCEDURE — 84443 ASSAY THYROID STIM HORMONE: CPT

## 2017-12-19 PROCEDURE — 87486 CHLMYD PNEUM DNA AMP PROBE: CPT

## 2017-12-19 PROCEDURE — 85730 THROMBOPLASTIN TIME PARTIAL: CPT

## 2017-12-19 PROCEDURE — 83605 ASSAY OF LACTIC ACID: CPT

## 2017-12-19 PROCEDURE — 87449 NOS EACH ORGANISM AG IA: CPT

## 2017-12-19 PROCEDURE — 82962 GLUCOSE BLOOD TEST: CPT

## 2017-12-19 PROCEDURE — 83735 ASSAY OF MAGNESIUM: CPT

## 2017-12-19 PROCEDURE — 82553 CREATINE MB FRACTION: CPT

## 2017-12-19 PROCEDURE — 85027 COMPLETE CBC AUTOMATED: CPT

## 2017-12-19 PROCEDURE — 94640 AIRWAY INHALATION TREATMENT: CPT

## 2017-12-19 PROCEDURE — 99285 EMERGENCY DEPT VISIT HI MDM: CPT | Mod: 25

## 2017-12-19 PROCEDURE — 80061 LIPID PANEL: CPT

## 2017-12-19 PROCEDURE — 93306 TTE W/DOPPLER COMPLETE: CPT

## 2017-12-19 PROCEDURE — 84484 ASSAY OF TROPONIN QUANT: CPT

## 2017-12-19 PROCEDURE — 87798 DETECT AGENT NOS DNA AMP: CPT

## 2017-12-19 PROCEDURE — 85610 PROTHROMBIN TIME: CPT

## 2017-12-19 PROCEDURE — 82746 ASSAY OF FOLIC ACID SERUM: CPT

## 2017-12-19 PROCEDURE — 82607 VITAMIN B-12: CPT

## 2017-12-19 PROCEDURE — 87581 M.PNEUMON DNA AMP PROBE: CPT

## 2017-12-19 PROCEDURE — 83036 HEMOGLOBIN GLYCOSYLATED A1C: CPT

## 2017-12-19 PROCEDURE — 81001 URINALYSIS AUTO W/SCOPE: CPT

## 2017-12-19 PROCEDURE — 87040 BLOOD CULTURE FOR BACTERIA: CPT

## 2017-12-19 PROCEDURE — 93005 ELECTROCARDIOGRAM TRACING: CPT

## 2017-12-19 PROCEDURE — 80048 BASIC METABOLIC PNL TOTAL CA: CPT

## 2017-12-19 PROCEDURE — 84100 ASSAY OF PHOSPHORUS: CPT

## 2017-12-19 PROCEDURE — 87633 RESP VIRUS 12-25 TARGETS: CPT

## 2017-12-19 PROCEDURE — 87899 AGENT NOS ASSAY W/OPTIC: CPT

## 2017-12-19 PROCEDURE — 71045 X-RAY EXAM CHEST 1 VIEW: CPT

## 2017-12-19 PROCEDURE — 87150 DNA/RNA AMPLIFIED PROBE: CPT

## 2017-12-19 PROCEDURE — 82550 ASSAY OF CK (CPK): CPT

## 2017-12-19 PROCEDURE — 87086 URINE CULTURE/COLONY COUNT: CPT

## 2017-12-19 PROCEDURE — 80053 COMPREHEN METABOLIC PANEL: CPT

## 2017-12-19 PROCEDURE — 83880 ASSAY OF NATRIURETIC PEPTIDE: CPT

## 2019-04-15 NOTE — ED PROVIDER NOTE - CONSTITUTIONAL, MLM
- - - Interpolation Flap Text: A decision was made to reconstruct the defect utilizing an interpolation axial flap and a staged reconstruction.  A telfa template was made of the defect.  This telfa template was then used to outline the interpolation flap.  The donor area for the pedicle flap was then injected with anesthesia.  The flap was excised through the skin and subcutaneous tissue down to the layer of the underlying musculature.  The interpolation flap was carefully excised within this deep plane to maintain its blood supply.  The edges of the donor site were undermined.   The donor site was closed in a primary fashion.  The pedicle was then rotated into position and sutured.  Once the tube was sutured into place, adequate blood supply was confirmed with blanching and refill.  The pedicle was then wrapped with xeroform gauze and dressed appropriately with a telfa and gauze bandage to ensure continued blood supply and protect the attached pedicle.

## 2021-02-04 NOTE — PROGRESS NOTE ADULT - PROBLEM SELECTOR PLAN 4
From: Jacobo Harris  To: Mike Yu  Sent: 2/3/2021 6:58 PM CST  Subject: Other    Misael wright   creatinine trending down

## 2021-02-07 NOTE — PATIENT PROFILE ADULT. - NS TRANSFER PATIENT BELONGINGS
Pt admitted into room 423. Pt assissted to bed x2 assist. No complains of pain at this time. Vitals obtained and assessment completed. Bed locked and in lowest position with bed alarm on. Call light within reach and pt oriented to room. Non skid footwear applied. Pt belongings: phone, phone , notebook and glasses. Given to pt. None

## 2021-04-14 NOTE — PROGRESS NOTE ADULT - PROBLEM SELECTOR PLAN 3
Pt meets d/c criteria for phase 1 in PACU and has been seen by anesthesia. Ok to transfer back to his room on med-surg unit; [id number]. Will alert anyone in waiting room for them and the nursing unit if applicable. Will continue to monitor for safety and comfort.     Froylan ENCINASN, RN, VIA [facility]  Pre-Op/Recovery   Same Day Surgery sodium of 150  patient not eating  c/w d5 of 50 cc/hr   Dr Diamond called for nephrology evaluation   not giving per free water deficit as he has EF of 15 percent sodium improved  patient not eating  c/w d5   Dr Diamond called for nephrology evaluation   not giving per free water deficit as he has EF of 15 percent

## 2023-06-21 NOTE — DISCHARGE NOTE ADULT - CARE PLAN
Detail Level: Detailed Depth Of Biopsy: dermis Was A Bandage Applied: Yes Size Of Lesion In Cm: 0.5 X Size Of Lesion In Cm: 0 Biopsy Type: H and E Biopsy Method: Dermablade Anesthesia Type: 1% lidocaine with epinephrine Hemostasis: Aluminum Chloride Wound Care: Petrolatum Dressing: bandage Destruction After The Procedure: No Type Of Destruction Used: Curettage Curettage Text: The wound bed was treated with curettage after the biopsy was performed. Cryotherapy Text: The wound bed was treated with cryotherapy after the biopsy was performed. Electrodesiccation Text: The wound bed was treated with electrodesiccation after the biopsy was performed. Electrodesiccation And Curettage Text: The wound bed was treated with electrodesiccation and curettage after the biopsy was performed. Silver Nitrate Text: The wound bed was treated with silver nitrate after the biopsy was performed. Consent: Written consent was obtained and risks were reviewed including but not limited to scarring, infection, bleeding, scabbing, incomplete removal, nerve damage and allergy to anesthesia. Post-Care Instructions: I reviewed with the patient in detail post-care instructions. Patient is to keep the biopsy site dry overnight, and then apply bacitracin twice daily until healed. Patient may apply hydrogen peroxide soaks to remove any crusting. Notification Instructions: Patient will be notified of biopsy results. However, patient instructed to call the office if not contacted within 2 weeks. Billing Type: Third-Party Bill Information: Selecting Yes will display possible errors in your note based on the variables you have selected. This validation is only offered as a suggestion for you. PLEASE NOTE THAT THE VALIDATION TEXT WILL BE REMOVED WHEN YOU FINALIZE YOUR NOTE. IF YOU WANT TO FAX A PRELIMINARY NOTE YOU WILL NEED TO TOGGLE THIS TO 'NO' IF YOU DO NOT WANT IT IN YOUR FAXED NOTE. Principal Discharge DX:	SOB (shortness of breath)  Goal:	comfort care  Instructions for follow-up, activity and diet:	care as per hospice team Principal Discharge DX:	Respiratory distress, acute  Goal:	comfort care  Instructions for follow-up, activity and diet:	Patient was admitted for severe shortness of breath. was treated for pneumonia and bacteremia with meropenem. Patient was minimally responsive at that time. prognosis was very poor. Palliative care team was contacted. Family decided patient to be at home hospice. Arranagements were made and patient will be discharged under care of home hospice. comfort care was provided with Lorazepam, morphine, fentanyl patch and scopolamine.  Secondary Diagnosis:	JUDAH (acute kidney injury)  Instructions for follow-up, activity and diet:	Patient had acute kidney injury likely secondary to poor oral intake.  Secondary Diagnosis:	Hypernatremia  Instructions for follow-up, activity and diet:	Patient came in with elevated serum sodium likely secondary to poor oral in take. Excess of fluids couldn't be given due to ejection fraction of 10% and poor function of heart. none

## 2023-09-13 NOTE — PROGRESS NOTE ADULT - PROBLEM SELECTOR PLAN 6
Transitional Care Management Telephone Call Attempt    Discharge Date: 9/6/23  Discharge Location: [facility]: [facility] [place]    Call Attempt Date: 9/13/2023  Call Attempt: First attempt at call #2    Dudley Lovell RN  Care Transitions Program  [phone] M-F 8-4:30     -IMPROVE score 2, starting heparin subcu for chemical DVT prophylaxis.

## 2024-02-19 NOTE — PROGRESS NOTE ADULT - PROBLEM SELECTOR PLAN 2
Cough post oral intake/Complaints of pharyngeal stasis PNA likely from community acquired vs aspiration  and possible underlying acute on chronic systolic chf.  cxr shows possible bilateral lower lobe infiltrate vs pulmonary edema  echo ef of 15 percent  Cardiology consulted no intervention at this time  c/w watchful monitoring

## 2024-05-21 NOTE — PROGRESS NOTE ADULT - PROBLEM SELECTOR PLAN 6
Physical Therapy Visit    Visit Type: Daily Treatment Note  Visit: 17  Referring Provider: Jad Todd MD  Medical Diagnosis (from order): S89.92XA - Injury of left knee, initial encounter   Treatment Diagnosis: left knee, LLE - impaired strength, impaired mobility, impaired motor function/performance/coordination, impaired range of motion, impaired joint play/mobility, impaired balance and increased pain/symptoms.  Onset  - Date of surgery: 3/4/2024  - Date of Surgery:  3/4/2024  - Procedure: Left Knee Arthroscopy, Anterior Cruciate Ligament Reconstruction With Patellar Tendon Autograft, Medial Meniscus Repair - Left  Patient alert and oriented X3.  Chart reviewed at time of initial evaluation (relevant co-morbidities, allergies, tests and medications listed):   unremarkable      SUBJECTIVE                                                                                                               Patient reports going to the gym doing leg press, leg extension, squats, Estonian split squats, etc. Has been working with [employer], school AT, as well. Has been walking a lot, like 4+ miles in last few days and knee has been sore.   Current Functional Limitations: Running  Stair ambulation with reciprocal pattern    Pain / Symptoms  - Pain rating (out of 10): Current: 2 ; Best: 2; Worst: 5  - Location: L knee  - Quality / Description: ache, pins and needles, shooting, sore, discomfort  - Alleviating Factors: rest, ice, relaxation techniques    Function:   Limitations / Exacerbation Factors:   - Patient reports pain, difficulty and increased time with function reported below.  - - Difficulty squatting  - Difficulty walking  - Difficulty ambulating stairs  - Difficulty standing for extended periods of time  Prior Level of Function: pain free ADLs and IADLs,    Patient Goals: increased motion, return to sport/leisure activities, decreased pain, increased strength, decreased edema and improved balance.    Prior  treatment  - no therapies  - Discharged from hospital, home health, or skilled nursing facility in last 30 days: yes  Home Environment   - Patient lives with: parent or legal guardian  - Assistance available: as needed  - Denies 2 or more falls or an unexplained fall with injury in the last year.  - Feel safe at home / work / school: yes      OBJECTIVE                                                                                                                     Range of Motion (ROM)   (degrees unless noted; active unless noted; norms in ( ); negative=lacking to 0, positive=beyond 0)  Knee:   - Flexion (150):       Left:  136 (Seated)   Flex (150) L PROM Degree: Seated.       Right:  145    - Extension (0-10):       Left:  0   Passive: -5       Right:  -5   Comments: 140 after flexion      Circumference  Knee   - 5 cm Proximal Joint Line:  Left: 33.5 cm   - Joint Line:  Left: 33 cm  Right: 32 cm  - 5 cm Distal to Joint Line:  Left: 30.5 cm                Outcome/Assessments  4/15/2024  Lower Extremity Functional Scale  LEFS Calculated Total 37    Treatment     Therapeutic Exercise  [place], back leg, 3 x 5   [place], front leg, 3 x 8, 12.5lb  Reverse [place] 2 x 5   Reverse step down, 3 x 8, 12\"    Bloodflow Restriction Training:  Location: Mid thigh  Cuff Size: 24in  Rep Scheme: 30/15/15/15  Position: supine  Exercises Performed: LAQ 7.5 lb; leg press   Limb Occlusion Pressure (LOP): 202 mmHg  Personal Treatment Pressure (PTP):  162 mmHg, 80% occlusion pressure  Duration: 20 minutes  Results: no adverse reaction to treatment, RPE: 9/10    Manual Therapy   Tibiofemoral joint distraction + PA mobilization grade I - IV    Neuromuscular Re-Education  Walking lunge, lateral lunge 2 x 20 yds  SL RDL holds 5 x 10\"   Split stance drops with holds, 3 x 5, 10\" hold     Skilled input: verbal instruction/cues and tactile instruction/cues    Writer verbally educated and received verbal consent for hand placement,  positioning of patient, and techniques to be performed today from patient for clothing adjustments for techniques, therapist position for techniques and hand placement and palpation for techniques as described above and how they are pertinent to the patient's plan of care.  Home Exercise Program  Access Code: [email or web address]  URL: [email or web address]  Date: 03/19/2024  Prepared by: Rodolfo Hernandez    Exercises  - Small Range Straight Leg Raise  - 2 x daily - 7 x weekly - 3 sets - 10 reps  - Sidelying Hip Abduction  - 2 x daily - 7 x weekly - 3 sets - 10 reps  - Long Sitting Quad Set  - 2 x daily - 7 x weekly - 3 sets - 20 reps  - Prone Quadriceps Set  - 2 x daily - 7 x weekly - 3 sets - 20 reps  - Supine Knee Extension Stretch on Towel Roll  - 2 x daily - 7 x weekly - 2 sets - 1 reps - 5 mis hold  - Side to Side Weight Shift with Counter Support  - 1 x daily - 7 x weekly - 1 sets - 2 reps - 2 min hold  - Staggered Stance Forward Backward Weight Shift with Counter Support  - 1 x daily - 7 x weekly - 1 sets - 2 reps - 2 min hold  - Single Leg Balance  - 1 x daily - 7 x weekly - 3 sets - 10 s hold      ASSESSMENT                                                                                                          17 year old patient has reported functional limitations listed above impacted by signs and symptoms consistent with treatment diagnosis below.  Treatment Diagnosis:   - Involved: left knee, LLE.  - Symptoms/impairments: impaired strength, impaired mobility, impaired motor function/performance/coordination, impaired range of motion, impaired joint play/mobility, impaired balance and increased pain/symptoms.  Patient will continue to benefit from skilled care as outlined.    Knee flexion ROM Improved to 140 degrees following MT, no changes in swelling noted compared to last session. Progressed mobility and strength exercises in load-volume as appropriate. Cueing necessary to ensure minimal  compensation with novel movement patterns. Patient primarily reported fatigue with exercise program, no significant increases in symptoms.      Prognosis: patient will benefit from skilled therapy  Rehabilitative potential is: good.  Predicted patient presentation: Moderate (evolving) - Patient comorbidities and complexities, as defined above, may have varying impact on steady progress for prescribed plan of care.  Education:   - Present and ready to learn: patient  - Results of above outlined education: Verbalizes understanding, Demonstrates understanding and Needs reinforcement    PLAN                                                                                                                          Continue interventions, frequency and duration established at initial evaluation.  The following skilled interventions to be implemented to achieve goals listed below:  Activities of Daily Living/Self Care (86186)  Gait Training (31098)  Neuromuscular Re-Education (88761)  Dry Needling  Manual Therapy (31600)  Therapeutic Activity (72777)  Electrical Stimulation Attended (36655)  Therapeutic Exercise (26643)    Frequency / Duration  2 times per week tapering as patient progresses for 6 months for an estimated total of 24 visits    Patient involved in and agreed to plan of care and goals.  Patient given attendance policy at time of initial evaluation.    Suggestions for next session as indicated: Progress per plan of care     Knee ROM  LE strengthening  Balance training      Goals  Long Term Goals: to be met by end of plan of care  1. The subjective outcome measure will improve to meet MCID and therefore demonstrate improved perceived ADL function.  Status: met  LEFS: 15 --> 37  2. Patient will demonstrate 90% limb symmetry on all physical performance tests to indicate an improvement in load capacity of lower extremity and decrease risk of injury when returning to age-appropriate recreational activities.  Status:  progressing/ongoing  3. Patient will demonstrate improved quadriceps and glute strength (5/5 or 90% LSI via dynamometry) to allow for compensation and pain-free full range of motion to squat to ground and complete ADLs.  Status: progressing/ongoing  4. Patient will demonstrate improved single leg balance (SLS 30\" with knee flexed) and strength (5/5 or 90% LSI via HHD) to allow to reciprocal gait pattern at all times when completing stairs.   Status: progressing/ongoing      Therapy procedure time and total treatment time can be found documented on the Time Entry flowsheet     palliative following, DNR/DNI now   not eating  candidate for inpatient vs home hospice  Patient is with very poor prognosis, awaiting transfer to inpatient hospice  HCP agreed to proceed with inpatient hospice, consent was signed, arrangements to be made by